# Patient Record
Sex: FEMALE | Race: WHITE | NOT HISPANIC OR LATINO | Employment: UNEMPLOYED | URBAN - METROPOLITAN AREA
[De-identification: names, ages, dates, MRNs, and addresses within clinical notes are randomized per-mention and may not be internally consistent; named-entity substitution may affect disease eponyms.]

---

## 2018-04-25 ENCOUNTER — EVALUATION (OUTPATIENT)
Dept: PHYSICAL THERAPY | Facility: CLINIC | Age: 48
End: 2018-04-25
Payer: COMMERCIAL

## 2018-04-25 ENCOUNTER — TRANSCRIBE ORDERS (OUTPATIENT)
Dept: PHYSICAL THERAPY | Facility: CLINIC | Age: 48
End: 2018-04-25

## 2018-04-25 DIAGNOSIS — S86.301A PERONEAL TENDON INJURY, RIGHT, INITIAL ENCOUNTER: ICD-10-CM

## 2018-04-25 DIAGNOSIS — S86.302A PERONEAL TENDON INJURY, LEFT, INITIAL ENCOUNTER: Primary | ICD-10-CM

## 2018-04-25 PROCEDURE — G8991 OTHER PT/OT GOAL STATUS: HCPCS | Performed by: PHYSICAL THERAPIST

## 2018-04-25 PROCEDURE — G8990 OTHER PT/OT CURRENT STATUS: HCPCS | Performed by: PHYSICAL THERAPIST

## 2018-04-25 PROCEDURE — 97162 PT EVAL MOD COMPLEX 30 MIN: CPT | Performed by: PHYSICAL THERAPIST

## 2018-04-25 PROCEDURE — 97535 SELF CARE MNGMENT TRAINING: CPT | Performed by: PHYSICAL THERAPIST

## 2018-04-25 NOTE — LETTER
2018    III Terry, DPM  [address]  [employer]  [address] [facility] [address] [address]    Patient: Pk Gray   YOB: 1970   Date of Visit: 2018     Encounter Diagnosis     ICD-10-CM    1  Peroneal tendon injury, left, initial encounter S86 302A    2  Peroneal tendon injury, right, initial encounter S86 301A        Dear Dr Shaffer Pain:    Please review the attached Plan of Care from [facility] recent visit  Please verify that you agree therapy should continue by signing the attached document and sending it back to our office  If you have any questions or concerns, please don't hesitate to call  Sincerely,    Oj Palomino PT      Referring Provider:      I certify that I have read the below Plan of Care and certify the need for these services furnished under this plan of treatment while under my care  III Terry, DPM  [address]  [employer]  [address] [facility] [address] [address]  VIA Facsimile: [phone]          PT Evaluation     Today's date: 2018  Patient name: kP Gray  : 1970  MRN: [id number]  Referring provider: Raymond Cross DPM  Dx:   Encounter Diagnosis     ICD-10-CM    1  Peroneal tendon injury, left, initial encounter S86 302A    2  Peroneal tendon injury, right, initial encounter S86 301A                   Assessment  Impairments: abnormal gait, abnormal or restricted ROM, activity intolerance, impaired physical strength and pain with function    Assessment details: Pk Gray is a 49 y o  female who presents to outpatient PT with a  Peroneal tendon injury, left, initial encounter  (primary encounter diagnosis)  Peroneal tendon injury, right, initial encounter  No further referral appears necessary at this time based upon examination results   Pt presents with decreased strength, ROM, balance, functional activity tolerance, and pain with movement in Bilateral feet and ankles, which is  limiting her ability to perform the aforementioned functional activities  Etiologic factors include repetitive poor body mechanics  Foot wear recommendations were made at the time of the initial evaluation  Pt was in agreement with footwear recommendations, and how it can affect her progress and performance at PT  Palpable tenderness to Bilateral soleus and plantar fascia  Prognosis is good given HEP compliance and PT 2/wk  Please contact me if you have any questions or recommendations  Thank you for the opportunity to share in  Brandon khan  Understanding of Dx/Px/POC: good   Prognosis: good    Goals  1  In 4-6 weeks, patient will demonstrate a decrease in pain to 0/10 during functional activities  2  In 4-6 weeks, patient will demonstrate an increase in range of motion by 5 degrees  3  In 4-6 weeks, patient will demonstrate an increase in strength by 1/2 grade on MMT    1  In 6-8 weeks, patient will be independent with their home exercise program  2  In 6-8 weeks, patient will have zero limitations with strength  3  In 6-8 weeks, patient will have zero limitations with ROM  4  In 6-8 weeks, patient will have zero limitations with ambulation  5  In 6-8 weeks, patient will have zero limitations with stair negotiation    Plan  Patient would benefit from: skilled PT  Planned therapy interventions: therapeutic exercise, manual therapy and home exercise program  Frequency: 2x week  Duration in weeks: 4  Treatment plan discussed with: patient  Plan details: Patient was provided a home exercise program and demonstrated an understanding of exercises  Patient was advised to stop performing home exercise program if symptoms increase or new complaints developed  Verbal understanding demonstrated regarding home exercise program instructions          Subjective Evaluation    History of Present Illness  Date of onset: 8/25/2017  Mechanism of injury: Pt reports that her feet began to bother her about the last 8 months, when she realized that the couldn't walk barefoot anymore  She reports that she has difficulty walking over uneven terrain, and that her ankle turn inwards  She saw the foot doctor, who referred her to PT for ankle and foot strengthening  Pain  Current pain ratin  At best pain ratin  At worst pain ratin  Quality: dull ache and throbbing  Relieving factors: relaxation  Aggravating factors: walking, stair climbing and standing    Treatments  Previous treatment: medication (Podiatrist  )  Current treatment: physical therapy  Patient Goals  Patient goals for therapy: increased motion, decreased pain, increased strength and return to sport/leisure activities  Patient goal: Be back to normal          Objective     Active Range of Motion   Left Ankle/Foot   Dorsiflexion (ke): 5 degrees   Plantar flexion: 60 degrees   Inversion: 50 degrees   Eversion: 20 degrees     Right Ankle/Foot   Dorsiflexion (ke): 8 degrees   Plantar flexion: 55 degrees   Inversion: 45 degrees   Eversion: 25 degrees     Additional Active Range of Motion Details  Pain and tenderness to bilateral Cuboid, soleus, and plantar fascia  Very mild calcaneal eversion during static standing  Strength/Myotome Testing     Left Ankle/Foot   Dorsiflexion: 4+  Plantar flexion: 4+  Inversion: 4+  Eversion: 4+    Right Ankle/Foot   Dorsiflexion: 4+  Plantar flexion: 4+  Inversion: 4+  Eversion: 4+          Precautions:     Daily Treatment Diary     Manual              PROM to B ankles                                                                      Exercise Diary              Towel Curls              Inv/Ev Towel             Gastroc Stretch              Pro Stretch              Ankle 4 way             Calf Raise              Leg Press              HR/TR on Leg Press              Step Up              Mini Squat              Biodex LOS              Biodex maze  Modalities

## 2018-04-25 NOTE — PROGRESS NOTES
PT Evaluation     Today's date: 2018  Patient name: Saskia Gong  : 1970  MRN: [id number]  Referring provider: Diaz Arrington DPM  Dx:   Encounter Diagnosis     ICD-10-CM    1  Peroneal tendon injury, left, initial encounter S86 302A    2  Peroneal tendon injury, right, initial encounter S86 301A                   Assessment  Impairments: abnormal gait, abnormal or restricted ROM, activity intolerance, impaired physical strength and pain with function    Assessment details: Saskia Gong is a 49 y o  female who presents to outpatient PT with a  Peroneal tendon injury, left, initial encounter  (primary encounter diagnosis)  Peroneal tendon injury, right, initial encounter  No further referral appears necessary at this time based upon examination results  Pt presents with decreased strength, ROM, balance, functional activity tolerance, and pain with movement in Bilateral feet and ankles, which is  limiting her ability to perform the aforementioned functional activities  Etiologic factors include repetitive poor body mechanics  Foot wear recommendations were made at the time of the initial evaluation  Pt was in agreement with footwear recommendations, and how it can affect her progress and performance at PT  Palpable tenderness to Bilateral soleus and plantar fascia  Prognosis is good given HEP compliance and PT 2/wk  Please contact me if you have any questions or recommendations  [facility] you [facility] the opportunity [facility]  Understanding of Dx/Px/POC: good   Prognosis: good    Goals  1  In 4-6 weeks, patient will demonstrate a decrease in pain to 0/10 during functional activities  2  In 4-6 weeks, patient will demonstrate an increase in range of motion by 5 degrees  3  In 4-6 weeks, patient will demonstrate an increase in strength by 1/2 grade on MMT    1  In 6-8 weeks, patient will be independent with their home exercise program  2    In 6-8 weeks, patient will have zero limitations with strength  3  In 6-8 weeks, patient will have zero limitations with ROM  4  In 6-8 weeks, patient will have zero limitations with ambulation  5  In 6-8 weeks, patient will have zero limitations with stair negotiation    Plan  Patient would benefit from: skilled PT  Planned therapy interventions: therapeutic exercise, manual therapy and home exercise program  Frequency: 2x week  Duration in weeks: 4  Treatment plan discussed with: patient  Plan details: Patient was provided a home exercise program and demonstrated an understanding of exercises  Patient was advised to stop performing home exercise program if symptoms increase or new complaints developed  Verbal understanding demonstrated regarding home exercise program instructions  Subjective Evaluation    History of Present Illness  Date of onset: 2017  Mechanism of injury: Pt reports that her feet began to bother her about the last 8 months, when she realized that the couldn't walk barefoot anymore  She reports that she has difficulty walking over uneven terrain, and that her ankle turn inwards  She saw the foot doctor, who referred her to PT for ankle and foot strengthening     Pain  Current pain ratin  At best pain ratin  At worst pain ratin  Quality: dull ache and throbbing  Relieving factors: relaxation  Aggravating factors: walking, stair climbing and standing    Treatments  Previous treatment: medication (Podiatrist  )  Current treatment: physical therapy  Patient Goals  Patient goals for therapy: increased motion, decreased pain, increased strength and return to sport/leisure activities  Patient goal: Be back to normal          Objective     Active Range of Motion   Left Ankle/Foot   Dorsiflexion (ke): 5 degrees   Plantar flexion: 60 degrees   Inversion: 50 degrees   Eversion: 20 degrees     Right Ankle/Foot   Dorsiflexion (ke): 8 degrees   Plantar flexion: 55 degrees   Inversion: 45 degrees   Eversion: 25 degrees Additional Active Range of Motion Details  Pain and tenderness to bilateral Cuboid, soleus, and plantar fascia  Very mild calcaneal eversion during static standing  Strength/Myotome Testing     Left Ankle/Foot   Dorsiflexion: 4+  Plantar flexion: 4+  Inversion: 4+  Eversion: 4+    Right Ankle/Foot   Dorsiflexion: 4+  Plantar flexion: 4+  Inversion: 4+  Eversion: 4+          Precautions:     Daily Treatment Diary     Manual              PROM to B ankles                                                                      Exercise Diary              Towel Curls              Inv/Ev Towel             Gastroc Stretch              Pro Stretch              Ankle 4 way             Calf Raise              Leg Press              HR/TR on Leg Press              Step Up              Mini Squat              Biodex LOS              Biodex [facility]                                                                                                                            Modalities

## 2018-04-26 ENCOUNTER — OFFICE VISIT (OUTPATIENT)
Dept: PHYSICAL THERAPY | Facility: CLINIC | Age: 48
End: 2018-04-26
Payer: COMMERCIAL

## 2018-04-26 DIAGNOSIS — S86.302A PERONEAL TENDON INJURY, LEFT, INITIAL ENCOUNTER: Primary | ICD-10-CM

## 2018-04-26 DIAGNOSIS — S86.301A PERONEAL TENDON INJURY, RIGHT, INITIAL ENCOUNTER: ICD-10-CM

## 2018-04-26 PROCEDURE — 97140 MANUAL THERAPY 1/> REGIONS: CPT

## 2018-04-26 PROCEDURE — 97110 THERAPEUTIC EXERCISES: CPT

## 2018-04-30 ENCOUNTER — OFFICE VISIT (OUTPATIENT)
Dept: PHYSICAL THERAPY | Facility: CLINIC | Age: 48
End: 2018-04-30
Payer: COMMERCIAL

## 2018-04-30 DIAGNOSIS — S86.302A PERONEAL TENDON INJURY, LEFT, INITIAL ENCOUNTER: ICD-10-CM

## 2018-04-30 DIAGNOSIS — S86.301A PERONEAL TENDON INJURY, RIGHT, INITIAL ENCOUNTER: Primary | ICD-10-CM

## 2018-04-30 PROCEDURE — 97140 MANUAL THERAPY 1/> REGIONS: CPT | Performed by: PHYSICAL THERAPIST

## 2018-04-30 PROCEDURE — 97110 THERAPEUTIC EXERCISES: CPT | Performed by: PHYSICAL THERAPIST

## 2018-04-30 NOTE — PROGRESS NOTES
Daily Note     Today's date: 2018  Patient name: Gary Zamora  : 1970  MRN: [id number]  Referring provider: Bradford Doshi DPM  Dx:   Encounter Diagnosis     ICD-10-CM    1  Peroneal tendon injury, right, initial encounter S86 301A    2  Peroneal tendon injury, left, initial encounter S86 302A                   Subjective: "I have a little pain in the arches of my feet, but overall feeling better " Patient feels the therapy is "definitely helping "      Objective: See treatment diary below  Daily Treatment Diary     Manual              PROM to B ankles  15'            IASTM B/L planter fasia  NV                                                       Exercise Diary              Towel Curls  2' B/L            Inv/Ev Towel 2' ea B/L            Gastroc Stretch  Towel/strap, 30"x3 ea            Pro Stretch              Ankle 4 way Blue TB x30 b/l            Calf Raise              Leg Press              HR/TR on Leg Press              Step Up              Mini Squat              Biodex LOS  Medium L12 x2            Biodex maze  Medium L12 x2            Upright bike L3 10'                                                                                                           Modalities                                                           Assessment: Tolerated treatment well  Patient demonstrated fatigue post treatment, exhibited good technique with therapeutic exercises and would benefit from continued PT Educated patient on self STM to b/l plantar fascia with frozen water bottle  Plan: Continue per plan of care  Progress treatment as tolerated

## 2018-05-02 ENCOUNTER — OFFICE VISIT (OUTPATIENT)
Dept: PHYSICAL THERAPY | Facility: CLINIC | Age: 48
End: 2018-05-02
Payer: COMMERCIAL

## 2018-05-02 DIAGNOSIS — S86.301A PERONEAL TENDON INJURY, RIGHT, INITIAL ENCOUNTER: ICD-10-CM

## 2018-05-02 DIAGNOSIS — S86.302A PERONEAL TENDON INJURY, LEFT, INITIAL ENCOUNTER: Primary | ICD-10-CM

## 2018-05-02 PROCEDURE — 97110 THERAPEUTIC EXERCISES: CPT

## 2018-05-02 PROCEDURE — 97140 MANUAL THERAPY 1/> REGIONS: CPT

## 2018-05-02 NOTE — PROGRESS NOTES
Daily Note     Today's date: 2018  Patient name: Keely Romo  : 1970  MRN: [id number]  Referring provider: Clayton Stone DPM  Dx:   Encounter Diagnosis     ICD-10-CM    1  Peroneal tendon injury, left, initial encounter S86 302A    2  Peroneal tendon injury, right, initial encounter S86 301A                   Subjective: Pt reports that the lateral aspect of her R foot is "annoying" today but denies true pain at rest        Objective: See treatment diary below  Daily Treatment Diary     Manual              PROM to B ankles  15'            IASTM B/L planter fascia  10'                                                       Exercise Diary              Towel Curls  2' B/L            Inv/Ev Towel 2' ea B/L            Gastroc Stretch  Towel/strap, 30"x3 ea NP            Pro Stretch              Ankle 4 way Blue TB x30 b/l            Calf Raise              Leg Press              HR/TR on Leg Press              Step Up              Mini Squat              Biodex LOS  Medium L12 x2            Biodex maze  Medium L12 x2            Upright bike L3 10'            HR on edge of stair with eccentric lowering  5" x30                                                                                              Modalities                                                         Assessment: Tolerated treatment well  Initiated IASTM to b/l planter fascia this date, some fibrous tissue noted  Also progressed to a HR on the edge of stair as listed in POC  Patient exhibited good technique with therapeutic exercises and would benefit from continued PT      Plan: Continue per plan of care  Progress treatment as tolerated

## 2018-05-08 ENCOUNTER — OFFICE VISIT (OUTPATIENT)
Dept: PHYSICAL THERAPY | Facility: CLINIC | Age: 48
End: 2018-05-08
Payer: COMMERCIAL

## 2018-05-08 DIAGNOSIS — S86.301A PERONEAL TENDON INJURY, RIGHT, INITIAL ENCOUNTER: ICD-10-CM

## 2018-05-08 DIAGNOSIS — S86.302A PERONEAL TENDON INJURY, LEFT, INITIAL ENCOUNTER: Primary | ICD-10-CM

## 2018-05-08 PROCEDURE — 97110 THERAPEUTIC EXERCISES: CPT | Performed by: PHYSICAL THERAPIST

## 2018-05-08 PROCEDURE — 97140 MANUAL THERAPY 1/> REGIONS: CPT | Performed by: PHYSICAL THERAPIST

## 2018-05-08 NOTE — PROGRESS NOTES
Daily Note     Today's date: 2018  Patient name: Kell Winter  : 1970  MRN: [id number]  Referring provider: Neno Webb DPM  Dx:   Encounter Diagnosis     ICD-10-CM    1  Peroneal tendon injury, left, initial encounter S86 302A    2  Peroneal tendon injury, right, initial encounter S86 301A                   Subjective: "I did a lot of walking this weekend at my nephew's graduation so I was pretty sore but I was able to rest yesterday "      Objective: See treatment diary below  Daily Treatment Diary     Manual              PROM to B ankles  10'            IASTM B/L planter fascia and medial gastroc  10'                                                       Exercise Diary              Towel Curls  2' B/L            Inv/Ev Towel 2' ea B/L            Gastroc Stretch  Towel/strap, 30"x3 ea            Pro Stretch              Ankle 4 way Blue TB x30 b/l-NP            Calf Raise              Leg Press              HR/TR on Leg Press              Step Up              Mini Squat              Biodex LOS  Medium L10 x2            Biodex maze  Medium L10 x2            Upright bike L3 10'            HR on edge of stair with eccentric lowering  5" x30                                                                                              Modalities                                                           Assessment: Tolerated treatment well  Continued with IASTM to plantar fascia but also performed along medial aspect of gastroc b/l  Tolerated manuals well  Patient exhibited good technique with therapeutic exercises and would benefit from continued PT      Plan: Continue per plan of care  Progress treatment as tolerated

## 2018-05-10 ENCOUNTER — APPOINTMENT (OUTPATIENT)
Dept: PHYSICAL THERAPY | Facility: CLINIC | Age: 48
End: 2018-05-10
Payer: COMMERCIAL

## 2018-05-11 ENCOUNTER — APPOINTMENT (OUTPATIENT)
Dept: PHYSICAL THERAPY | Facility: CLINIC | Age: 48
End: 2018-05-11
Payer: COMMERCIAL

## 2018-05-15 ENCOUNTER — OFFICE VISIT (OUTPATIENT)
Dept: PHYSICAL THERAPY | Facility: CLINIC | Age: 48
End: 2018-05-15
Payer: COMMERCIAL

## 2018-05-15 DIAGNOSIS — S86.301A PERONEAL TENDON INJURY, RIGHT, INITIAL ENCOUNTER: ICD-10-CM

## 2018-05-15 DIAGNOSIS — S86.302A PERONEAL TENDON INJURY, LEFT, INITIAL ENCOUNTER: Primary | ICD-10-CM

## 2018-05-15 PROCEDURE — 97110 THERAPEUTIC EXERCISES: CPT | Performed by: PHYSICAL THERAPIST

## 2018-05-15 PROCEDURE — 97140 MANUAL THERAPY 1/> REGIONS: CPT | Performed by: PHYSICAL THERAPIST

## 2018-05-15 NOTE — PROGRESS NOTES
Daily Note     Today's date: 5/15/2018  Patient name: Greg Chen  : 1970  MRN: [id number]  Referring provider: Brandon Velazquez DPM  Dx:   Encounter Diagnosis     ICD-10-CM    1  Peroneal tendon injury, left, initial encounter S86 302A    2  Peroneal tendon injury, right, initial encounter S86 301A                   Subjective: Pt reports that her feet are feeling a little sore today  Objective: See treatment diary below    Daily Treatment Diary     Manual            PROM to B ankles  10'  10'          IASTM B/L planter fascia and medial gastroc  10'  10'                                                     Exercise Diary            Towel Curls  2' B/L  2' b/l          Inv/Ev Towel 2' ea B/L  2' b/l          Gastroc Stretch  Towel/strap, 30"x3 ea            Pro Stretch    30''3x          Ankle 4 way Blue TB x30 b/l-NP  Blue 30x          Calf Raise              Leg Press              HR/TR on Leg Press              Step Up              Mini Squat              Biodex LOS  Medium L10 x2  Medium L 10          Biodex maze  Medium L10 x2  Medium L 10           Upright bike L3 10'  L3 10min           HR on edge of stair with eccentric lowering  5" x30  5''30x                                                                                             Modalities                                                                 Assessment: Tolerated treatment well  Patient exhibited good technique with therapeutic exercises  Added Pro stretch this session, to help increase the flexibility of bilateral gastroc  Plan: Continue per plan of care

## 2018-05-17 ENCOUNTER — OFFICE VISIT (OUTPATIENT)
Dept: PHYSICAL THERAPY | Facility: CLINIC | Age: 48
End: 2018-05-17
Payer: COMMERCIAL

## 2018-05-17 DIAGNOSIS — S86.301A PERONEAL TENDON INJURY, RIGHT, INITIAL ENCOUNTER: ICD-10-CM

## 2018-05-17 DIAGNOSIS — S86.302A PERONEAL TENDON INJURY, LEFT, INITIAL ENCOUNTER: Primary | ICD-10-CM

## 2018-05-17 PROCEDURE — 97110 THERAPEUTIC EXERCISES: CPT | Performed by: PHYSICAL THERAPIST

## 2018-05-17 PROCEDURE — 97140 MANUAL THERAPY 1/> REGIONS: CPT | Performed by: PHYSICAL THERAPIST

## 2018-05-17 NOTE — PROGRESS NOTES
Daily Note     Today's date: 2018  Patient name: Daja Hidalgo  : 1970  MRN: [id number]  Referring provider: Alejandra Zurita DPM  Dx:   Encounter Diagnosis     ICD-10-CM    1  Peroneal tendon injury, left, initial encounter S86 302A    2  Peroneal tendon injury, right, initial encounter S86 301A                   Subjective: " My feet feel ok, I still haven't found a wide pair of shoes yet "       Objective: See treatment diary below    Daily Treatment Diary     Manual           PROM to B ankles  10'  10' NP         IASTM B/L planter fascia and medial gastroc  10'  10' 15 Min                                                    Exercise Diary           Towel Curls  2' B/L  2' b/l 2' b/l          Inv/Ev Towel 2' ea B/L  2' b/l 2'bl/l          Gastroc Stretch  Towel/strap, 30"x3 ea            Pro Stretch    30''3x 30''3x Bilateral          Ankle 4 way Blue TB x30 b/l-NP  Blue 30x Blue 30x          Calf Raise              Leg Press              HR/TR on Leg Press              Step Up              Mini Squat              Biodex LOS  Medium L10 x2  Medium L 10 Medium L 8          Biodex maze  Medium L10 x2  Medium L 10  Medium L8          Upright bike L3 10'  L3 10min  L3 10 min          HR on edge of stair with eccentric lowering  5" x30  5''30x           Leg press    80 3x10         HR/TR     60 3x10                                                                  Modalities                                                             Assessment: Tolerated treatment well  Patient exhibited good technique with therapeutic exercises  Added Leg press and  HR/TR on the leg press this session to help with ankle and lower extremity strengthening  Plan: Continue per plan of care

## 2018-05-22 ENCOUNTER — APPOINTMENT (OUTPATIENT)
Dept: PHYSICAL THERAPY | Facility: CLINIC | Age: 48
End: 2018-05-22
Payer: COMMERCIAL

## 2018-05-25 ENCOUNTER — APPOINTMENT (OUTPATIENT)
Dept: PHYSICAL THERAPY | Facility: CLINIC | Age: 48
End: 2018-05-25
Payer: COMMERCIAL

## 2018-06-28 ENCOUNTER — EVALUATION (OUTPATIENT)
Dept: PHYSICAL THERAPY | Facility: CLINIC | Age: 48
End: 2018-06-28
Payer: COMMERCIAL

## 2018-06-28 PROCEDURE — G8991 OTHER PT/OT GOAL STATUS: HCPCS | Performed by: PHYSICAL THERAPIST

## 2018-06-28 PROCEDURE — G8990 OTHER PT/OT CURRENT STATUS: HCPCS | Performed by: PHYSICAL THERAPIST

## 2018-06-28 PROCEDURE — 97164 PT RE-EVAL EST PLAN CARE: CPT | Performed by: PHYSICAL THERAPIST

## 2018-06-28 PROCEDURE — 97110 THERAPEUTIC EXERCISES: CPT | Performed by: PHYSICAL THERAPIST

## 2018-06-28 NOTE — LETTER
June 29, 2018    III Pershing, DPM  [address]  [employer]  [address] [address] [address]    Patient: Benjamin Gomez   YOB: 1970   Date of Visit: 6/28/2018   No diagnosis found  Dear Dr Carmen Medellin:    Please review the attached Plan of Care from [facility] recent visit  Please verify that you agree therapy should continue by signing the attached document and sending it back to our office  If you have any questions or concerns, please don't hesitate to call  Sincerely,    Cooper Blackburn, PT      Referring Provider:      I certify that I have read the below Plan of Care and certify the need for these services furnished under this plan of treatment while under my care  III Pershing, DPM  [address]  [employer]  [place] [address]  VIA Facsimile: [phone]                                                                 Re-Assessment         Assessment  Impairments: abnormal gait, abnormal or restricted ROM, activity intolerance, impaired physical strength and pain with function    Assessment details  Benjamin Gomez has demonstrated decreased pain, increased strength, increased range of motion, and increased activity tolerance since starting physical therapy services  They report an overall improvement of 90 % thus far  At this time, patient has achieved their maximum functional benefit from skilled physical therapy services and will be discharged to their [facility]  Patient is in agreement with the plan of care  As a result, patient is discharged from physical therapy    Understanding of Dx/Px/POC: good   Prognosis: good    Goals  1  In 4-6 weeks, patient will demonstrate a decrease in pain to 0/10 during functional activities  Met   2  In 4-6 weeks, patient will demonstrate an increase in range of motion by 5 degrees  Met   3  In 4-6 weeks, patient will demonstrate an increase in strength by 1/2 grade on MMT  Partially Met     1    In 6-8 weeks, patient will be independent with their home exercise program  Met   2  In 6-8 weeks, patient will have zero limitations with strength  Partially Met   3  In 6-8 weeks, patient will have zero limitations with ROM  Met   4  In 6-8 weeks, patient will have zero limitations with ambulation  Met   5  In 6-8 weeks, patient will have zero limitations with stair negotiation  Met     Plan  Patient would benefit from: skilled PT  Planned therapy interventions: therapeutic exercise, manual therapy and home exercise program  Frequency: 2x week  Duration in weeks: 4  Treatment plan discussed with: patient  Plan details: Patient was provided a home exercise program and demonstrated an understanding of exercises  Patient was advised to stop performing home exercise program if symptoms increase or new complaints developed  Verbal understanding demonstrated regarding home exercise program instructions  Subjective Evaluation    History of Present Illness  Date of onset: 2017  Mechanism of injury:  Pt reports that she feels about 90 percent better since she's started physical therapy  She reports that she is not back to her normal activities  She still gets occasional pain in her big toe, and in the arch of her foot  She went to the [employer] store in [place], where they fitted her with wide running shoes  She reports that the shoes made her feet feel much better  She would like to continue strengthening at physical therapy         Pain  Current pain ratin  At best pain ratin  At worst pain ratin  Quality: dull ache and throbbing  Relieving factors: relaxation  Aggravating factors: walking, stair climbing and standing    Treatments  Previous treatment: medication (Podiatrist  )  Current treatment: physical therapy  Patient Goals  Patient goals for therapy: increased motion, decreased pain, increased strength and return to sport/leisure activities  Patient goal: Be back to normal          Objective     Active Range of Motion   Left Ankle/Foot                      6/27  Dorsiflexion (ke): 5 degrees  8 degrees  Plantar flexion: 60 degrees    70 degrees   Inversion: 50 degrees            40 degrees  Eversion: 20 degrees            35 degrees    Right Ankle/Foot                    6/27  Dorsiflexion (ke): 8 degrees    10 degrees   Plantar flexion: 55 degrees      65 degrees   Inversion: 45 degrees               55 degrees   Eversion: 25 degrees               15 degrees     Additional Active Range of Motion Details  Pain and tenderness to bilateral Cuboid, soleus, and plantar fascia  Very mild calcaneal eversion during static standing       Strength/Myotome Testing     Left Ankle/Foot                6/27   Dorsiflexion: 4+              4+  Plantar flexion: 4+          4+  Inversion: 4+                  4+  Eversion: 4+                   4+  Right Ankle/Foot              6/27   Dorsiflexion: 4+              4+  Plantar flexion: 4+          4+  Inversion: 4+                  4+  Eversion: 4+                  4+

## 2018-06-28 NOTE — PROGRESS NOTES
Re-Assessment         Assessment  Impairments: abnormal gait, abnormal or restricted ROM, activity intolerance, impaired physical strength and pain with function    Assessment details  Jovany Hernandez has demonstrated decreased pain, increased strength, increased range of motion, and increased activity tolerance since starting physical therapy services  They report an overall improvement of 90 % thus far  At this time, patient has achieved their maximum functional benefit from skilled physical therapy services and will be discharged to their [facility]  Patient is in agreement with the plan of care  As a result, patient is discharged from physical therapy    Understanding of Dx/Px/POC: good   Prognosis: good    Goals  1  In 4-6 weeks, patient will demonstrate a decrease in pain to 0/10 during functional activities  Met   2  In 4-6 weeks, patient will demonstrate an increase in range of motion by 5 degrees  Met   3  In 4-6 weeks, patient will demonstrate an increase in strength by 1/2 grade on MMT  Partially Met     1  In 6-8 weeks, patient will be independent with their home exercise program  Met   2  In 6-8 weeks, patient will have zero limitations with strength  Partially Met   3  In 6-8 weeks, patient will have zero limitations with ROM  Met   4  In 6-8 weeks, patient will have zero limitations with ambulation  Met   5  In 6-8 weeks, patient will have zero limitations with stair negotiation  Met     Plan  Patient would benefit from: skilled PT  Planned therapy interventions: therapeutic exercise, manual therapy and home exercise program  Frequency: 2x week  Duration in weeks: 4  Treatment plan discussed with: patient  Plan details: Patient was provided a home exercise program and demonstrated an understanding of exercises  Patient was advised to stop performing home exercise program if symptoms increase or new complaints developed    Verbal understanding demonstrated regarding home exercise program instructions  Subjective Evaluation    History of Present Illness  Date of onset: 2017  Mechanism of injury:  Pt reports that she feels about 90 percent better since she's started physical therapy  She reports that she is not back to her normal activities  She still gets occasional pain in her big toe, and in the arch of her foot  She went to the [employer] in [place], where they fitted her with wide running shoes  She reports that the shoes made her feet feel much better  She would like to continue strengthening at physical therapy  Pain  Current pain ratin  At best pain ratin  At worst pain ratin  Quality: dull ache and throbbing  Relieving factors: relaxation  Aggravating factors: walking, stair climbing and standing    Treatments  Previous treatment: medication (Podiatrist  )  Current treatment: physical therapy  Patient Goals  Patient goals for therapy: increased motion, decreased pain, increased strength and return to sport/leisure activities  Patient goal: Be back to normal          Objective     Active Range of Motion   Left Ankle/Foot                        Dorsiflexion (ke): 5 degrees  8 degrees  Plantar flexion: 60 degrees    70 degrees   Inversion: 50 degrees            40 degrees  Eversion: 20 degrees            35 degrees    Right Ankle/Foot                      Dorsiflexion (ke): 8 degrees    10 degrees   Plantar flexion: 55 degrees      65 degrees   Inversion: 45 degrees               55 degrees   Eversion: 25 degrees               15 degrees     Additional Active Range of Motion Details  Pain and tenderness to bilateral Cuboid, soleus, and plantar fascia  Very mild calcaneal eversion during static standing       Strength/Myotome Testing     Left Ankle/Foot                   Dorsiflexion: 4+              4+  Plantar flexion: 4+          4+  Inversion: 4+                  4+  Eversion: 4+ 4+  Right Ankle/Foot              6/27   Dorsiflexion: 4+              4+  Plantar flexion: 4+          4+  Inversion: 4+                  4+  Eversion: 4+                  4+

## 2018-06-29 ENCOUNTER — TRANSCRIBE ORDERS (OUTPATIENT)
Dept: PHYSICAL THERAPY | Facility: CLINIC | Age: 48
End: 2018-06-29

## 2018-06-29 DIAGNOSIS — S86.302A PERONEAL TENDON INJURY, LEFT, INITIAL ENCOUNTER: Primary | ICD-10-CM

## 2018-10-29 ENCOUNTER — APPOINTMENT (OUTPATIENT)
Dept: PHYSICAL THERAPY | Facility: CLINIC | Age: 48
End: 2018-10-29
Payer: COMMERCIAL

## 2018-11-01 ENCOUNTER — EVALUATION (OUTPATIENT)
Dept: PHYSICAL THERAPY | Facility: CLINIC | Age: 48
End: 2018-11-01
Payer: COMMERCIAL

## 2018-11-01 ENCOUNTER — TRANSCRIBE ORDERS (OUTPATIENT)
Dept: PHYSICAL THERAPY | Facility: CLINIC | Age: 48
End: 2018-11-01

## 2018-11-01 DIAGNOSIS — M76.70 PERONEAL TENDINITIS, UNSPECIFIED LATERALITY: Primary | ICD-10-CM

## 2018-11-01 DIAGNOSIS — M72.9 FIBROMATOSES OF MUSCLE, LIGAMENT, AND FASCIA: ICD-10-CM

## 2018-11-01 DIAGNOSIS — R26.9 GAIT ABNORMALITY: ICD-10-CM

## 2018-11-01 DIAGNOSIS — M72.9 FASCIITIS: ICD-10-CM

## 2018-11-01 PROCEDURE — G8991 OTHER PT/OT GOAL STATUS: HCPCS | Performed by: PHYSICAL THERAPIST

## 2018-11-01 PROCEDURE — G8990 OTHER PT/OT CURRENT STATUS: HCPCS | Performed by: PHYSICAL THERAPIST

## 2018-11-01 PROCEDURE — 97140 MANUAL THERAPY 1/> REGIONS: CPT | Performed by: PHYSICAL THERAPIST

## 2018-11-01 PROCEDURE — 97162 PT EVAL MOD COMPLEX 30 MIN: CPT | Performed by: PHYSICAL THERAPIST

## 2018-11-01 NOTE — LETTER
2018    III [place], DPM  [address]  [address] [facility] [address] [address]    Patient: Cuba Londono   YOB: 1970   Date of Visit: 2018     Encounter Diagnosis     ICD-10-CM    1  Peroneal tendinitis, unspecified laterality M76 70    2  Gait abnormality R26 9    3  Fasciitis M72 9        Dear Dr Wellington Nail:    Please review the attached Plan of Care from [facility] recent visit  Please verify that you agree therapy should continue by signing the attached document and sending it back to our office  If you have any questions or concerns, please don't hesitate to call  Sincerely,    Cleveland Young PT      Referring Provider:      I certify that I have read the below Plan of Care and certify the need for these services furnished under this plan of treatment while under my care  III [place], DPM  [address]  [address] [facility] [address] [address]  VIA Facsimile: [phone]          PT Evaluation     Today's date: 2018  Patient name: Cuba Londono  : 1970  MRN: [id number]  Referring provider: Carlota Patel DPM   Dx:   Encounter Diagnosis     ICD-10-CM    1  Peroneal tendinitis, unspecified laterality M76 70    2  Gait abnormality R26 9    3  Fasciitis M72 9                   Assessment  Impairments: abnormal gait, abnormal or restricted ROM, activity intolerance, impaired physical strength, pain with function and poor posture     Assessment details: Cuba Londono is a 51 y o  female who presents to outpatient PT with a  Peroneal tendinitis, unspecified laterality  (primary encounter diagnosis)  Gait abnormality  Fasciitis  No further referral appears necessary at this time based upon examination results  Pt presents with decreased strength, ROM, balance, functional activity tolerance, and pain with movement in her bilateral feet and ankles which is  limiting her ability to perform the aforementioned functional activities  Etiologic factors include repetitive poor body mechanics  Prognosis is good given HEP compliance and PT 2-3/wk  Please contact me if you have any questions or recommendations  Thank you for the opportunity to share in  Khalida's care  Understanding of Dx/Px/POC: good   Prognosis: good    Goals  1  In 4-6 weeks, patient will demonstrate a decrease in pain to 0/10 during functional activities  2  In 4-6 weeks, patient will demonstrate an increase in range of motion by 5 degrees  3  In 4-6 weeks, patient will demonstrate an increase in strength by 1/2 grade on MMT    1  In 6-8 weeks, patient will be independent with their home exercise program  2  In 6-8 weeks, patient will have zero limitations with strength  3  In 6-8 weeks, patient will have zero limitations with ROM  4  In 6-8 weeks, patient will have zero limitations with ambulation  5  In 6-8 weeks, patient will have zero limitations with stair negotiation    Plan  Patient would benefit from: skilled physical therapy  Planned therapy interventions: manual therapy, therapeutic exercise and home exercise program  Frequency: 2x week  Duration in weeks: 4  Treatment plan discussed with: patient        Subjective Evaluation    History of Present Illness  Date of onset: 2018  Mechanism of injury: The patient is a 51year old female with a chief complaint of bilateral foot pain  She was treated at physical therapy before, for this same condition  She did get relief from therapy   She reports that she has not been keeping up with her exercises at home  " I want to catch this before it gets bad "   Pain  Current pain rating: 3  At best pain ratin  At worst pain ratin  Quality: throbbing and dull ache  Relieving factors: ice, relaxation, change in position and support  Aggravating factors: standing and walking    Social Support    Employment status: not working  Hand dominance: right      Diagnostic Tests  No diagnostic tests performed  Treatments  Previous treatment: physical therapy  Current treatment: physical therapy  Patient Goals  Patient goals for therapy: decreased pain, increased strength and increased motion          Objective     Active Range of Motion   Left Ankle/Foot   Dorsiflexion (ke): WFL  Dorsiflexion (kf): WFL  Plantar flexion: WFL  Inversion: WFL    Right Ankle/Foot   Dorsiflexion (ke): WFL  Dorsiflexion (kf): WFL  Plantar flexion: WFL  Inversion: WFL    Additional Active Range of Motion Details  Excessive Calcaneal Inversion bilaterally, during static standing  Pain and tenderness to the base of the 5th metatarsal,     Good wear pattern on the bottom of both shoes  Pain with resisted Eversion on right  side, Resisted inversion on left side       Strength/Myotome Testing     Left Ankle/Foot   Dorsiflexion: 4+  Plantar flexion: 4+  Inversion: 4+  Eversion: 4+    Right Ankle/Foot   Dorsiflexion: 4+  Plantar flexion: 4+  Inversion: 4+  Eversion: 4+        Daily Treatment Diary     Manual  11/1            PROM - B ankles all planes             IASTM to B Gastroc                                         15 min                 Exercise Diary              NuStep or Bike             Gastroc towel stretch             Ankle AROM - all directions             Ankle isometrics - all directions             Ankle TB - all directions             Towel - Inv/Evr             HR/TR             Wobbleboard - all directions             Biodex - Limits of Stability             Biodex - Maze             Biodex - Random Control             Standing soleus stretch             Standing gastroc stretch                 Modalities

## 2018-11-01 NOTE — PROGRESS NOTES
PT Evaluation     Today's date: 2018  Patient name: Amita Padron  : 1970  MRN: [id number]  Referring provider: Vida Moran DPM   Dx:   Encounter Diagnosis     ICD-10-CM    1  Peroneal tendinitis, unspecified laterality M76 70    2  Gait abnormality R26 9    3  Fasciitis M72 9                   Assessment  Impairments: abnormal gait, abnormal or restricted ROM, activity intolerance, impaired physical strength, pain with function and poor posture     Assessment details: Amita Padron is a 51 y o  female who presents to outpatient PT with a  Peroneal tendinitis, unspecified laterality  (primary encounter diagnosis)  Gait abnormality  Fasciitis  No further referral appears necessary at this time based upon examination results  Pt presents with decreased strength, ROM, balance, functional activity tolerance, and pain with movement in her bilateral feet and ankles which is  limiting her ability to perform the aforementioned functional activities  Etiologic factors include repetitive poor body mechanics  Prognosis is good given HEP compliance and PT 2-3/wk  Please contact me if you have any questions or recommendations  Thank you for the opportunity to share in  Khalida's care  Understanding of Dx/Px/POC: good   Prognosis: good    Goals  1  In 4-6 weeks, patient will demonstrate a decrease in pain to 0/10 during functional activities  2  In 4-6 weeks, patient will demonstrate an increase in range of motion by 5 degrees  3  In 4-6 weeks, patient will demonstrate an increase in strength by 1/2 grade on MMT    1  In 6-8 weeks, patient will be independent with their home exercise program  2  In 6-8 weeks, patient will have zero limitations with strength  3  In 6-8 weeks, patient will have zero limitations with ROM  4  In 6-8 weeks, patient will have zero limitations with ambulation  5    In 6-8 weeks, patient will have zero limitations with stair negotiation    Plan  Patient would benefit from: skilled physical therapy  Planned therapy interventions: manual therapy, therapeutic exercise and home exercise program  Frequency: 2x week  Duration in weeks: 4  Treatment plan discussed with: patient        Subjective Evaluation    History of Present Illness  Date of onset: 2018  Mechanism of injury: The patient is a 51year old female with a chief complaint of bilateral foot pain  She was treated at physical therapy before, for this same condition  She did get relief from therapy  She reports that she has not been keeping up with her exercises at home  " I want to catch this before it gets bad "   Pain  Current pain rating: 3  At best pain ratin  At worst pain ratin  Quality: throbbing and dull ache  Relieving factors: ice, relaxation, change in position and support  Aggravating factors: standing and walking    Social Support    Employment status: not working  Hand dominance: right      Diagnostic Tests  No diagnostic tests performed  Treatments  Previous treatment: physical therapy  Current treatment: physical therapy  Patient Goals  Patient goals for therapy: decreased pain, increased strength and increased motion          Objective     Active Range of Motion   Left Ankle/Foot   Dorsiflexion (ke): WFL  Dorsiflexion (kf): WFL  Plantar flexion: WFL  Inversion: WFL    Right Ankle/Foot   Dorsiflexion (ke): WFL  Dorsiflexion (kf): WFL  Plantar flexion: WFL  Inversion: WFL    Additional Active Range of Motion Details  Excessive Calcaneal Inversion bilaterally, during static standing  Pain and tenderness to the base of the 5th metatarsal,     Good wear pattern on the bottom of both shoes  Pain with resisted Eversion on right  side, Resisted inversion on left side       Strength/Myotome Testing     Left Ankle/Foot   Dorsiflexion: 4+  Plantar flexion: 4+  Inversion: 4+  Eversion: 4+    Right Ankle/Foot   Dorsiflexion: 4+  Plantar flexion: 4+  Inversion: 4+  Eversion: 4+        Daily Treatment Diary     Manual  11/1            PROM - B ankles all planes             IASTM to B Gastroc                                         15 min                 Exercise Diary              NuStep or Bike             Gastroc towel stretch             Ankle AROM - all directions             Ankle isometrics - all directions             Ankle TB - all directions             Towel - Inv/Evr             HR/TR             Wobbleboard - all directions             Biodex - Limits of Stability             Biodex - Maze             Biodex - Random Control             Standing soleus stretch             Standing gastroc stretch                 Modalities

## 2018-11-07 ENCOUNTER — OFFICE VISIT (OUTPATIENT)
Dept: PHYSICAL THERAPY | Facility: CLINIC | Age: 48
End: 2018-11-07
Payer: COMMERCIAL

## 2018-11-07 DIAGNOSIS — M76.70 PERONEAL TENDINITIS, UNSPECIFIED LATERALITY: Primary | ICD-10-CM

## 2018-11-07 DIAGNOSIS — M72.9 FASCIITIS: ICD-10-CM

## 2018-11-07 DIAGNOSIS — R26.9 GAIT ABNORMALITY: ICD-10-CM

## 2018-11-07 PROCEDURE — 97140 MANUAL THERAPY 1/> REGIONS: CPT | Performed by: PHYSICAL THERAPIST

## 2018-11-07 PROCEDURE — 97110 THERAPEUTIC EXERCISES: CPT | Performed by: PHYSICAL THERAPIST

## 2018-11-07 NOTE — PROGRESS NOTES
Daily Note     Today's date: 2018  Patient name: Bentley Ramirez  : 1970  MRN: [id number]  Referring provider: Amairani Remy DPM  Dx:   Encounter Diagnosis     ICD-10-CM    1  Peroneal tendinitis, unspecified laterality M76 70    2  Gait abnormality R26 9    3  Fasciitis M72 9                   Subjective: I felt good when I left after the last session, i'm still a little sore today "       Objective: See treatment diary below  Manual              PROM - B ankles all planes             IASTM to B Gastroc   25 min                                       15 min  25 min                Exercise Diary              NuStep or Bike  [address] [facility] 10'           Gastroc towel stretch  30''3x Bilateral            Ankle AROM - all directions             Ankle isometrics - all directions             Ankle TB - all directions             Towel - Inv/Evr  Curls, Inv/Ev 30x            HR/TR  30x           Wobbleboard - all directions             Biodex - Limits of Stability             Biodex - Maze             Biodex - Random Control             Standing soleus stretch             Standing gastroc stretch    Pro stretch   30''3x       30''3x                     Modalities                                                               Assessment: Tolerated treatment well  Patient exhibited good technique with therapeutic exercises  Pt presents with significant soft tissue restrictions to her bilateral gastroc, during IASTM  Plan: Continue per plan of care

## 2018-11-09 ENCOUNTER — APPOINTMENT (OUTPATIENT)
Dept: PHYSICAL THERAPY | Facility: CLINIC | Age: 48
End: 2018-11-09
Payer: COMMERCIAL

## 2018-11-12 ENCOUNTER — OFFICE VISIT (OUTPATIENT)
Dept: PHYSICAL THERAPY | Facility: CLINIC | Age: 48
End: 2018-11-12
Payer: COMMERCIAL

## 2018-11-12 DIAGNOSIS — M72.9 FASCIITIS: ICD-10-CM

## 2018-11-12 DIAGNOSIS — R26.9 GAIT ABNORMALITY: ICD-10-CM

## 2018-11-12 DIAGNOSIS — M76.70 PERONEAL TENDINITIS, UNSPECIFIED LATERALITY: Primary | ICD-10-CM

## 2018-11-12 PROCEDURE — 97140 MANUAL THERAPY 1/> REGIONS: CPT

## 2018-11-12 PROCEDURE — 97110 THERAPEUTIC EXERCISES: CPT

## 2018-11-12 NOTE — PROGRESS NOTES
Daily Note     Today's date: 2018  Patient name: Rachel Rodríguez  : 1970  MRN: [id number]  Referring provider: Milagro Anna DPM  Dx: No diagnosis found  Subjective: Pt  Reports she still has pain on outside of R ankle as well some pain L foot  Objective: See treatment diary below  Manual            PROM - B ankles all planes             IASTM to B Gastroc   25 min                                       15 min  25 min  25 min              Exercise Diary             NuStep or Bike  [address] [facility] 10' [address] [facility] 10'          Gastroc towel stretch  30''3x Bilateral  30"3x b/l          Ankle AROM - all directions   10x b/l          Ankle isometrics - all directions             Ankle TB - all directions   10x b/l yellow          Towel - Inv/Evr  Curls, Inv/Ev 30x  Curls  INV/EV  30x          HR/TR  30x 10x as/pt request          Wobbleboard - all directions             Biodex - Limits of Stability             Biodex - Maze             Biodex - Random Control             Standing soleus stretch             Standing gastroc stretch    Pro stretch   30''3x       30''3x       30"3x      30"3x              Modalities                                                               Assessment: Tolerated treatment well  Patient exhibited good technique with therapeutic exercises and would benefit from continued PT  Pt  notes relief following RX session  Plan: Progress treatment as tolerated

## 2018-11-14 ENCOUNTER — APPOINTMENT (OUTPATIENT)
Dept: PHYSICAL THERAPY | Facility: CLINIC | Age: 48
End: 2018-11-14
Payer: COMMERCIAL

## 2018-11-15 ENCOUNTER — OFFICE VISIT (OUTPATIENT)
Dept: PHYSICAL THERAPY | Facility: CLINIC | Age: 48
End: 2018-11-15
Payer: COMMERCIAL

## 2018-11-15 DIAGNOSIS — M76.70 PERONEAL TENDINITIS, UNSPECIFIED LATERALITY: Primary | ICD-10-CM

## 2018-11-15 DIAGNOSIS — R26.9 GAIT ABNORMALITY: ICD-10-CM

## 2018-11-15 DIAGNOSIS — M72.9 FASCIITIS: ICD-10-CM

## 2018-11-15 PROCEDURE — 97140 MANUAL THERAPY 1/> REGIONS: CPT

## 2018-11-15 PROCEDURE — 97110 THERAPEUTIC EXERCISES: CPT

## 2018-11-15 NOTE — PROGRESS NOTES
Daily Note     Today's date: 11/15/2018  Patient name: Taisha Ulloa  : 1970  MRN: [id number]  Referring provider: Amanda Sánchez DPM  Dx:   Encounter Diagnosis     ICD-10-CM    1  Peroneal tendinitis, unspecified laterality M76 70    2  Gait abnormality R26 9    3  Fasciitis M72 9                   Subjective: "I am having some pain in the left foot into my big toe "       Objective: See treatment diary below  Manual  11/1 11/6  11/12 11/15         PROM - B ankles all planes             IASTM to B Gastroc   25 min                                       15 min  25 min  25 min 25 min             Exercise Diary   11/6 11/12 11/15         NuStep or Bike  [facility] 10' [facility] 10' [facility] 10' L5         Gastroc towel stretch  30''3x Bilateral  30"3x b/l 30"3x b/l         Ankle AROM - all directions   10x b/l          Ankle isometrics - all directions             Ankle TB - all directions   10x b/l yellow 10x b/l Blue         Towel - Inv/Evr  Curls, Inv/Ev 30x  Curls  INV/EV  30x          HR/TR  30x 10x as/pt request x20         Wobbleboard - all directions             Biodex - Limits of Stability             Biodex - Maze             Biodex - Random Control             Standing soleus stretch             Standing gastroc stretch    Pro stretch   30''3x       30''3x       30"3x      30"3x              Modalities                                                         Assessment: Tolerated treatment well  Patient exhibited good technique with therapeutic exercises and would benefit from continued PT  No adverse affect to IASTM this date  Plan: Continue per plan of care  Progress treatment as tolerated

## 2018-11-19 ENCOUNTER — OFFICE VISIT (OUTPATIENT)
Dept: PHYSICAL THERAPY | Facility: CLINIC | Age: 48
End: 2018-11-19
Payer: COMMERCIAL

## 2018-11-19 DIAGNOSIS — R26.9 GAIT ABNORMALITY: ICD-10-CM

## 2018-11-19 DIAGNOSIS — M72.9 FASCIITIS: ICD-10-CM

## 2018-11-19 DIAGNOSIS — M76.70 PERONEAL TENDINITIS, UNSPECIFIED LATERALITY: Primary | ICD-10-CM

## 2018-11-19 PROCEDURE — 97140 MANUAL THERAPY 1/> REGIONS: CPT | Performed by: PHYSICAL THERAPIST

## 2018-11-19 PROCEDURE — 97110 THERAPEUTIC EXERCISES: CPT | Performed by: PHYSICAL THERAPIST

## 2018-11-20 ENCOUNTER — OFFICE VISIT (OUTPATIENT)
Dept: PHYSICAL THERAPY | Facility: CLINIC | Age: 48
End: 2018-11-20
Payer: COMMERCIAL

## 2018-11-20 DIAGNOSIS — M76.70 PERONEAL TENDINITIS, UNSPECIFIED LATERALITY: Primary | ICD-10-CM

## 2018-11-20 DIAGNOSIS — R26.9 GAIT ABNORMALITY: ICD-10-CM

## 2018-11-20 DIAGNOSIS — M72.9 FASCIITIS: ICD-10-CM

## 2018-11-20 PROCEDURE — 97110 THERAPEUTIC EXERCISES: CPT

## 2018-11-20 PROCEDURE — 97035 APP MDLTY 1+ULTRASOUND EA 15: CPT

## 2018-11-20 NOTE — PROGRESS NOTES
Daily Note     Today's date: 2018  Patient name: Rachel Rodríguez  : 1970  MRN: [id number]  Referring provider: Milagro Anna DPM  Dx:   Encounter Diagnosis     ICD-10-CM    1  Peroneal tendinitis, unspecified laterality M76 70    2  Gait abnormality R26 9    3  Fasciitis M72 9                   Subjective: "It feels better today " Pt denies contraindications for US  Objective: See treatment diary below  Manual  11/20 11/6  11/12 11/15 11/19   PROM - B ankles all planes        IASTM to B Gastroc   25 min                        Held this date  25 min  25 min 25 min 25 min        Exercise Diary  11/20 11/6 11/12 11/15 11/19   NuStep or Bike [facility] L5 x10' [facility] 10' [facility] 10' [facility] 10' L5 [facility] 10' L5    Gastroc towel stretch 30''3x Bilateral  30''3x Bilateral  30"3x b/l 30"3x b/l 30''3x B/L    Ankle AROM - all directions   10x b/l  10x b/l    Ankle isometrics - all directions        Ankle TB - all directions 10x b/l Blue  10x b/l yellow 10x b/l Blue 10x b/l    Towel - Inv/Evr Curls  INV/EV  30x Curls, Inv/Ev 30x  Curls  INV/EV  30x  Curls  INV/EV  30x   HR/TR x20 30x 10x as/pt request x20 x20    Wobbleboard - all directions        Biodex - Limits of Stability        Biodex - Maze        Biodex - Random Control        Standing soleus stretch        Standing gastroc stretch    Pro stretch  30" x3      30" x3 30''3x       30''3x       30"3x      30"3x  30''3x      30''3x        Modalities              US to b/l planter fascia  8' ea  1 MHz, continuous, 1 4 W/cm2                                           Assessment: Tolerated treatment well  Pt has some ecchymoses on b/l lateral gastrocs secondary to IASTM last visit  Held manuals  Performed US to b/l planer fascia with mo adverse affect  Patient demonstrated fatigue post treatment, exhibited good technique with therapeutic exercises and would benefit from continued PT  Continue to monitor ecchymoses  Plan: Continue per plan of care    Progress treatment as tolerated

## 2018-11-27 ENCOUNTER — APPOINTMENT (OUTPATIENT)
Dept: PHYSICAL THERAPY | Facility: CLINIC | Age: 48
End: 2018-11-27
Payer: COMMERCIAL

## 2018-11-29 ENCOUNTER — OFFICE VISIT (OUTPATIENT)
Dept: PHYSICAL THERAPY | Facility: CLINIC | Age: 48
End: 2018-11-29
Payer: COMMERCIAL

## 2018-11-29 DIAGNOSIS — R26.9 GAIT ABNORMALITY: ICD-10-CM

## 2018-11-29 DIAGNOSIS — M72.9 FASCIITIS: ICD-10-CM

## 2018-11-29 DIAGNOSIS — M76.70 PERONEAL TENDINITIS, UNSPECIFIED LATERALITY: Primary | ICD-10-CM

## 2018-11-29 PROCEDURE — 97035 APP MDLTY 1+ULTRASOUND EA 15: CPT

## 2018-11-29 PROCEDURE — 97110 THERAPEUTIC EXERCISES: CPT

## 2018-11-29 NOTE — PROGRESS NOTES
Daily Note     Today's date: 2018  Patient name: Edmond Sharp  : 1970  MRN: [id number]  Referring provider: Mahendra Peralta DPM  Dx:   Encounter Diagnosis     ICD-10-CM    1  Peroneal tendinitis, unspecified laterality M76 70    2  Gait abnormality R26 9    3  Fasciitis M72 9                   Subjective: "I felt great after the US last visit "    Objective: See treatment diary below  Manual  11/20 11/29  11/12 11/15 11/19   PROM - B ankles all planes        IASTM to B Gastroc                          Held this date  Held this date  25 min 25 min 25 min        Exercise Diary  11/20 11/29 11/12 11/15 11/19   NuStep or Bike [facility] L5 x10' [facility] L6 10' [facility] 10' [facility] 10' L5 [facility] 10' L5    Gastroc towel stretch 30''3x Bilateral  30''3x Bilateral  30"3x b/l 30"3x b/l 30''3x B/L    Ankle AROM - all directions   10x b/l  10x b/l    Ankle isometrics - all directions        Ankle TB - all directions 10x b/l Blue  10x b/l yellow 10x b/l Blue 10x b/l    Towel - Inv/Evr Curls  INV/EV  30x Curls, Inv/Ev 30x  Curls  INV/EV  30x  Curls  INV/EV  30x   HR/TR x20 30x 10x as/pt request x20 x20    Wobbleboard - all directions        Biodex - Limits of Stability        Biodex - Maze        Biodex - Random Control        Standing soleus stretch        Standing gastroc stretch    Pro stretch  30" x3      30" x3 30''3x       30''3x       30"3x      30"3x  30''3x      30''3x        Modalities        US to b/l planter fascia  8' ea  1 MHz, continuous, 1 4 W/cm2  8' ea  3 3 MHz, 50%, 1 4 W/cm2                           Assessment: Tolerated treatment well  Patient demonstrated fatigue post treatment, exhibited good technique with therapeutic exercises and would benefit from continued PT  No adverse affect to US this date  Plan: Continue per plan of care  Progress treatment as tolerated

## 2018-12-03 ENCOUNTER — OFFICE VISIT (OUTPATIENT)
Dept: PHYSICAL THERAPY | Facility: CLINIC | Age: 48
End: 2018-12-03
Payer: COMMERCIAL

## 2018-12-03 DIAGNOSIS — R26.9 GAIT ABNORMALITY: ICD-10-CM

## 2018-12-03 DIAGNOSIS — M76.70 PERONEAL TENDINITIS, UNSPECIFIED LATERALITY: Primary | ICD-10-CM

## 2018-12-03 DIAGNOSIS — M72.9 FASCIITIS: ICD-10-CM

## 2018-12-03 PROCEDURE — 97035 APP MDLTY 1+ULTRASOUND EA 15: CPT

## 2018-12-03 PROCEDURE — 97110 THERAPEUTIC EXERCISES: CPT

## 2018-12-03 NOTE — PROGRESS NOTES
Daily Note     Today's date: 12/3/2018  Patient name: Charles John  : 1970  MRN: [id number]  Referring provider: Norma Wilkinson DPM  Dx:   Encounter Diagnosis     ICD-10-CM    1  Peroneal tendinitis, unspecified laterality M76 70    2  Gait abnormality R26 9    3  Fasciitis M72 9                   Subjective: Pt  reports she has pain in both feet today  Feels she might be overdoing it  Pt  reports the US felt great the first time and not so good the last time  Objective: See treatment diary below  Manual  11/20 11/29  12/3 11/15 11/19   PROM - B ankles all planes        IASTM to B Gastroc                          Held this date  Held this date  Held this date 25 min 25 min        Exercise Diary  11/20 11/29 12/3 11/15 11/19   NuStep or Bike [address] [facility] L5 x10' [address] [facility] L6 10' [address] [facility] 10' [address] [facility] 10' L5 [address] [facility] 10' L5    Gastroc towel stretch 30''3x Bilateral  30''3x Bilateral  30"3x b/l 30"3x b/l 30''3x B/L    Ankle AROM - all directions   10x b/l  10x b/l    Ankle isometrics - all directions   MRE's x20 ea     Ankle TB - all directions 10x b/l Blue  10x b/l blue 10x b/l Blue 10x b/l    Towel - Inv/Evr Curls  INV/EV  30x Curls, Inv/Ev 30x  Curls  INV/EV  30x  Curls  INV/EV  30x   HR/TR x20 30x 30x x20 x20    Wobbleboard - all directions        Biodex - Limits of Stability        Biodex - Maze        Biodex - Random Control        Standing soleus stretch        Standing gastroc stretch    Pro stretch  30" x3      30" x3 30''3x       30''3x       30"3x      30"3x  30''3x      30''3x        Modalities  11/20 11/29 12/3     US to b/l planter fascia  8' ea  1 MHz, continuous, 1 4 W/cm2  8' ea  3 3 MHz, 50%, 1 4 W/cm2  8'ea  1MHZ  Continuous  1 4 w/cm2                         Assessment: Tolerated treatment well  Patient exhibited good technique with therapeutic exercises and would benefit from continued PT      Plan: Progress treatment as tolerated

## 2018-12-05 ENCOUNTER — OFFICE VISIT (OUTPATIENT)
Dept: PHYSICAL THERAPY | Facility: CLINIC | Age: 48
End: 2018-12-05
Payer: COMMERCIAL

## 2018-12-05 DIAGNOSIS — M76.70 PERONEAL TENDINITIS, UNSPECIFIED LATERALITY: Primary | ICD-10-CM

## 2018-12-05 DIAGNOSIS — M72.9 FASCIITIS: ICD-10-CM

## 2018-12-05 DIAGNOSIS — R26.9 GAIT ABNORMALITY: ICD-10-CM

## 2018-12-05 PROCEDURE — 97110 THERAPEUTIC EXERCISES: CPT | Performed by: PHYSICAL THERAPIST

## 2018-12-05 PROCEDURE — 97035 APP MDLTY 1+ULTRASOUND EA 15: CPT | Performed by: PHYSICAL THERAPIST

## 2018-12-05 NOTE — PROGRESS NOTES
Daily Note     Today's date: 2018  Patient name: Kate Wiley  : 1970  MRN: [id number]  Referring provider: Nela Hernandez DPM  Dx:   Encounter Diagnosis     ICD-10-CM    1  Peroneal tendinitis, unspecified laterality M76 70    2  Gait abnormality R26 9    3  Fasciitis M72 9                   Subjective: " Im not really sure if the ultrasound is helping at this point, Sometimes it feels better "       Objective: See treatment diary below  Manual  11/20 11/29  12/3 12/5  11/19   PROM - B ankles all planes        IASTM to B Gastroc                          Held this date  Held this date  Held this date Held this date  25 min        Exercise Diary  11/20 11/29 12/3 12/5 11/19   NuStep or Bike [address] [facility] L5 x10' [address] [facility] L6 10' [address] [facility] 10' [address] [facility] 10' L5 3435 [facility] 10' L5    Gastroc towel stretch 30''3x Bilateral  30''3x Bilateral  30"3x b/l 30"3x b/l 30''3x B/L    Ankle AROM - all directions   10x b/l  10x b/l    Ankle isometrics - all directions   MRE's x20 ea     Ankle TB - all directions 10x b/l Blue  10x b/l blue 10x b/l Blue 10x b/l    Towel - Inv/Evr Curls  INV/EV  30x Curls, Inv/Ev 30x  Curls  INV/EV  30x  Curls  INV/EV  30x   HR/TR x20 30x 30x x20 x20    Wobbleboard - all directions        Biodex - Limits of Stability        Biodex - Maze        Biodex - Random Control        Standing soleus stretch        Standing gastroc stretch    Pro stretch  30" x3      30" x3 30''3x       30''3x       30"3x      30"3x 30''3x      30''3x 30''3x      30''3x        Modalities  11/20 11/29 12/3 12/5     US to b/l planter fascia  8' ea  1 MHz, continuous, 1 4 W/cm2  8' ea  3 3 MHz, 50%, 1 4 W/cm2  8'ea  1MHZ  Continuous  1 4 w/cm2 8'ea  1MHZ  Continuous  1 4 w/cm2                          Assessment: Tolerated treatment well  Patient exhibited good technique with therapeutic exercises  Plan: Continue per plan of care

## 2018-12-10 ENCOUNTER — OFFICE VISIT (OUTPATIENT)
Dept: PHYSICAL THERAPY | Facility: CLINIC | Age: 48
End: 2018-12-10
Payer: COMMERCIAL

## 2018-12-10 DIAGNOSIS — R26.9 GAIT ABNORMALITY: ICD-10-CM

## 2018-12-10 DIAGNOSIS — M72.9 FASCIITIS: ICD-10-CM

## 2018-12-10 DIAGNOSIS — M76.70 PERONEAL TENDINITIS, UNSPECIFIED LATERALITY: Primary | ICD-10-CM

## 2018-12-10 PROCEDURE — 97110 THERAPEUTIC EXERCISES: CPT | Performed by: PHYSICAL THERAPIST

## 2018-12-10 PROCEDURE — 97140 MANUAL THERAPY 1/> REGIONS: CPT | Performed by: PHYSICAL THERAPIST

## 2018-12-10 NOTE — PROGRESS NOTES
Daily Note     Today's date: 12/10/2018  Patient name: Lester Ramírez  : 1970  MRN: [id number]  Referring provider: Daniella Parra DPM  Dx:   Encounter Diagnosis     ICD-10-CM    1  Peroneal tendinitis, unspecified laterality M76 70    2  Gait abnormality R26 9    3  Fasciitis M72 9                   Subjective: " I think my feet are doing a little better today "        Objective: See treatment diary below  Manual  11/20 11/29  12/3 12/5  12/10   PROM - B ankles all planes        IASTM to B Gastroc                          Held this date  Held this date  Held this date Held this date  20 min        Exercise Diary  11/20 11/29 12/3 12/5 12/10   NuStep or Bike [address] [facility] L5 x10' [address] [facility] L6 10' [address] [facility] 10' [address] [facility] 10' L5 34[address] [facility] 10' L5    Gastroc towel stretch 30''3x Bilateral  30''3x Bilateral  30"3x b/l 30"3x b/l 30''3x B/L    Ankle AROM - all directions   10x b/l  10x b/l    Ankle isometrics - all directions   MRE's x20 ea     Ankle TB - all directions 10x b/l Blue  10x b/l blue 10x b/l Blue 10x b/l    Towel - Inv/Evr Curls  INV/EV  30x Curls, Inv/Ev 30x  Curls  INV/EV  30x  Curls  INV/EV  30x   HR/TR x20 30x 30x x20 x20    Wobbleboard - all directions        Biodex - Limits of Stability        Biodex - Maze        Biodex - Random Control        Standing soleus stretch        Standing gastroc stretch    Pro stretch  30" x3      30" x3 30''3x       30''3x       30"3x      30"3x 30''3x      30''3x 30''3x      30''3x        Modalities  11/20 11/29 12/3 12/5     US to b/l planter fascia  8' ea  1 MHz, continuous, 1 4 W/cm2  8' ea  3 3 MHz, 50%, 1 4 W/cm2  8'ea  1MHZ  Continuous  1 4 w/cm2 8'ea  1MHZ  Continuous  1 4 w/cm2                        Assessment: Tolerated treatment well  Patient exhibited good technique with therapeutic exercises      Plan: Continue per plan of care

## 2018-12-12 ENCOUNTER — APPOINTMENT (OUTPATIENT)
Dept: PHYSICAL THERAPY | Facility: CLINIC | Age: 48
End: 2018-12-12
Payer: COMMERCIAL

## 2018-12-17 ENCOUNTER — TRANSCRIBE ORDERS (OUTPATIENT)
Dept: PHYSICAL THERAPY | Facility: CLINIC | Age: 48
End: 2018-12-17

## 2018-12-17 ENCOUNTER — EVALUATION (OUTPATIENT)
Dept: PHYSICAL THERAPY | Facility: CLINIC | Age: 48
End: 2018-12-17
Payer: COMMERCIAL

## 2018-12-17 DIAGNOSIS — M76.70 PERONEAL TENDINITIS, UNSPECIFIED LATERALITY: Primary | ICD-10-CM

## 2018-12-17 DIAGNOSIS — M72.9 FIBROMATOSES OF MUSCLE, LIGAMENT, AND FASCIA: ICD-10-CM

## 2018-12-17 DIAGNOSIS — R26.9 ABNORMALITY OF GAIT: ICD-10-CM

## 2018-12-17 DIAGNOSIS — R26.9 GAIT ABNORMALITY: ICD-10-CM

## 2018-12-17 DIAGNOSIS — M72.9 FASCIITIS: ICD-10-CM

## 2018-12-17 PROCEDURE — 97035 APP MDLTY 1+ULTRASOUND EA 15: CPT | Performed by: PHYSICAL THERAPIST

## 2018-12-17 PROCEDURE — 97110 THERAPEUTIC EXERCISES: CPT | Performed by: PHYSICAL THERAPIST

## 2018-12-17 PROCEDURE — G8990 OTHER PT/OT CURRENT STATUS: HCPCS | Performed by: PHYSICAL THERAPIST

## 2018-12-17 PROCEDURE — G8991 OTHER PT/OT GOAL STATUS: HCPCS | Performed by: PHYSICAL THERAPIST

## 2018-12-17 PROCEDURE — 97164 PT RE-EVAL EST PLAN CARE: CPT | Performed by: PHYSICAL THERAPIST

## 2018-12-17 NOTE — LETTER
December 17, 2018    III Susy Andres DPM  [address]  [employer]  [address] [address]    Patient: Amita Padron   YOB: 1970   Date of Visit: 12/17/2018     Encounter Diagnosis     ICD-10-CM    1  Peroneal tendinitis, unspecified laterality M76 70    2  Gait abnormality R26 9    3  Fasciitis M72 9        Dear Dr Contreras Arch:    Please review the attached Plan of Care from [facility] recent visit  Please verify that you agree therapy should continue by signing the attached document and sending it back to our office  If you have any questions or concerns, please don't hesitate to call  Sincerely,    Carmelita Brittle, PT      Referring Provider:      I certify that I have read the below Plan of Care and certify the need for these services furnished under this plan of treatment while under my care  III Susy Andres DPM  [address]  [employer]  [place] [address]  VIA Facsimile: [phone]          PT Re-Evaluation       Assessment  Impairments: abnormal gait, abnormal or restricted ROM, activity intolerance, impaired physical strength, pain with function and poor posture     Assessment details: Amita Padron has demonstrated decreased pain, increased strength, increased range of motion, and increased activity tolerance since starting physical therapy services  They report an overall improvement of 60% thus far  She continues to present with pain, decreased strength, decreased range of motion, and decreased activity tolerance and would benefit from additional skilled physical therapy interventions to address impairments and maximize function  Understanding of Dx/Px/POC: good   Prognosis: good    Goals  1  In 4-6 weeks, patient will demonstrate a decrease in pain to 0/10 during functional activities  2  In 4-6 weeks, patient will demonstrate an increase in range of motion by 5 degrees  3   In 4-6 weeks, patient will demonstrate an increase in strength by 1/2 grade on MMT    1  In 6-8 weeks, patient will be independent with their home exercise program - Met   2  In 6-8 weeks, patient will have zero limitations with strength  3  In 6-8 weeks, patient will have zero limitations with ROM  4  In 6-8 weeks, patient will have zero limitations with ambulation  5  In 6-8 weeks, patient will have zero limitations with stair negotiation    Plan  Patient would benefit from: skilled physical therapy  Planned therapy interventions: manual therapy, therapeutic exercise and home exercise program  Frequency: 2x week  Duration in weeks: 4  Treatment plan discussed with: patient        Subjective Evaluation    History of Present Illness  Date of onset: 2018  Mechanism of injury: The patient is a 51year old female with a chief complaint of bilateral foot pain  She was treated at physical therapy before, for this same condition  She did get relief from therapy  She reports that she has not been keeping up with her exercises at home  " I want to catch this before it gets bad "       RA      The patient reports that she feels 60 percent improved since beginning physical therapy  She reports That the strength and flexibility in her feet have improved   She reports that walking is painful for her at times  " I think it may be time for a new pair of shoes " " I also think that the ultrasound may be helping, I notice a difference when we don't do it "               Pain  Current pain rating: 3  At best pain ratin  At worst pain ratin  Quality: throbbing and dull ache  Relieving factors: ice, relaxation, change in position and support  Aggravating factors: standing and walking    Social Support    Employment status: not working  Hand dominance: right      Diagnostic Tests  No diagnostic tests performed  Treatments  Previous treatment: physical therapy  Current treatment: physical therapy  Patient Goals  Patient goals for therapy: decreased pain, increased strength and increased motion          Objective     Active Range of Motion   Left Ankle/Foot                  12/17   Dorsiflexion (ke): [facility]       WFL  Dorsiflexion (kf): [facility]  Plantar flexion: [facility]           WFL  Inversion: [facility]                   WFL     Right Ankle/Foot              12/17   Dorsiflexion (ke): [facility]      WFL   Dorsiflexion (kf): [facility]       WFL    Plantar flexion: [facility]         WFL    Inversion: [facility]                 WFL    Additional Active Range of Motion Details  Excessive Calcaneal Inversion bilaterally, during static standing  Pain and tenderness to the base of the 5th metatarsal,     Good wear pattern on the bottom of both shoes  Pain with resisted Eversion on right  side, Resisted inversion on left side       Strength/Myotome Testing     Left Ankle/Foot                 12/17       Dorsiflexion: 4+                  4+   Plantar flexion: 4+              4+  Inversion: 4+                      4+  Eversion: 4+                      4+    Right Ankle/Foot   Dorsiflexion: 4+                4+  Plantar flexion: 4+            4+  Inversion: 4+                    4+  Eversion: 4+                    4+    Manual  12/17 11/29  12/3 12/5  12/10   PROM - B ankles all planes        IASTM to B Gastroc                          Held this date  Held this date  Held this date Held this date  20 min        Exercise Diary  12/17 11/29 12/3 12/5 12/10   NuStep or Bike [address] [facility] L5 x10' [address] [facility] L6 10' [address] [facility] 10' [address] [facility] 10' L5 [address] [facility] 10' L5    Gastroc towel stretch 30''3x Bilateral  30''3x Bilateral  30"3x b/l 30"3x b/l 30''3x B/L    Ankle AROM - all directions   10x b/l  10x b/l    Ankle isometrics - all directions   MRE's x20 ea     Ankle TB - all directions 10x b/l Blue  10x b/l blue 10x b/l Blue 10x b/l    Towel - Inv/Evr Curls  INV/EV  30x Curls, Inv/Ev 30x  Curls  INV/EV  30x  Curls  INV/EV  30x   HR/TR x20 30x 30x x20 x20    Wobbleboard - all directions        Biodex - Limits of Stability        Biodex - Maze        Biodex - Random Control        Standing soleus stretch        Standing gastroc stretch    Pro stretch  30" x3      30" x3 30''3x       30''3x       30"3x      30"3x 30''3x      30''3x 30''3x      30''3x        Modalities  12/17 11/29 12/3 12/5     US to b/l planter fascia  8' ea  1 MHz, continuous, 1 4 W/cm2  8' ea  3 3 MHz, 50%, 1 4 W/cm2  8'ea  1MHZ  Continuous  1 4 w/cm2 8'ea  1MHZ  Continuous  1 4 w/cm2

## 2018-12-17 NOTE — PROGRESS NOTES
PT Re-Evaluation       Assessment  Impairments: abnormal gait, abnormal or restricted ROM, activity intolerance, impaired physical strength, pain with function and poor posture     Assessment details: Jeremi Vila has demonstrated decreased pain, increased strength, increased range of motion, and increased activity tolerance since starting physical therapy services  They report an overall improvement of 60% thus far  She continues to present with pain, decreased strength, decreased range of motion, and decreased activity tolerance and would benefit from additional skilled physical therapy interventions to address impairments and maximize function  Understanding of Dx/Px/POC: good   Prognosis: good    Goals  1  In 4-6 weeks, patient will demonstrate a decrease in pain to 0/10 during functional activities  2  In 4-6 weeks, patient will demonstrate an increase in range of motion by 5 degrees  3  In 4-6 weeks, patient will demonstrate an increase in strength by 1/2 grade on MMT    1  In 6-8 weeks, patient will be independent with their home exercise program - Met   2  In 6-8 weeks, patient will have zero limitations with strength  3  In 6-8 weeks, patient will have zero limitations with ROM  4  In 6-8 weeks, patient will have zero limitations with ambulation  5  In 6-8 weeks, patient will have zero limitations with stair negotiation    Plan  Patient would benefit from: skilled physical therapy  Planned therapy interventions: manual therapy, therapeutic exercise and home exercise program  Frequency: 2x week  Duration in weeks: 4  Treatment plan discussed with: patient        Subjective Evaluation    History of Present Illness  Date of onset: 9/1/2018  Mechanism of injury: The patient is a 51year old female with a chief complaint of bilateral foot pain  She was treated at physical therapy before, for this same condition  She did get relief from therapy   She reports that she has not been keeping up with her exercises at home  " I want to catch this before it gets bad "       RA      The patient reports that she feels 60 percent improved since beginning physical therapy  She reports That the strength and flexibility in her feet have improved  She reports that walking is painful for her at times  " I think it may be time for a new pair of shoes " " I also think that the ultrasound may be helping, I notice a difference when we don't do it "               Pain  Current pain rating: 3  At best pain ratin  At worst pain ratin  Quality: throbbing and dull ache  Relieving factors: ice, relaxation, change in position and support  Aggravating factors: standing and walking    Social Support    Employment status: not working  Hand dominance: right      Diagnostic Tests  No diagnostic tests performed  Treatments  Previous treatment: physical therapy  Current treatment: physical therapy  Patient Goals  Patient goals for therapy: decreased pain, increased strength and increased motion          Objective     Active Range of Motion   Left Ankle/Foot                     Dorsiflexion (ke): [facility]       WFL  Dorsiflexion (kf): [facility]  Plantar flexion: [facility]           WFL  Inversion: [facility]                   WFL     Right Ankle/Foot                 Dorsiflexion (ke): [facility]      WFL   Dorsiflexion (kf): [facility]       WFL    Plantar flexion: [facility]         WFL    Inversion: [facility]                 WFL    Additional Active Range of Motion Details  Excessive Calcaneal Inversion bilaterally, during static standing  Pain and tenderness to the base of the 5th metatarsal,     Good wear pattern on the bottom of both shoes  Pain with resisted Eversion on right  side, Resisted inversion on left side       Strength/Myotome Testing     Left Ankle/Foot                        Dorsiflexion: 4+                  4+   Plantar flexion: 4+              4+  Inversion: 4+                      4+  Eversion: 4+ 4+    Right Ankle/Foot   Dorsiflexion: 4+                4+  Plantar flexion: 4+            4+  Inversion: 4+                    4+  Eversion: 4+                    4+    Manual  12/17 11/29  12/3 12/5  12/10   PROM - B ankles all planes        IASTM to B Gastroc                          Held this date  Held this date  Held this date Held this date  20 min        Exercise Diary  12/17 11/29 12/3 12/5 12/10   NuStep or Bike [address] [facility] L5 x10' [address] [facility] L6 10' [address] [facility] [address] 10' [address] [facility] 10' L5 [address] [facility] 10' L5    Gastroc towel stretch 30''3x Bilateral  30''3x Bilateral  30"3x b/l 30"3x b/l 30''3x B/L    Ankle AROM - all directions   10x b/l  10x b/l    Ankle isometrics - all directions   MRE's x20 ea     Ankle TB - all directions 10x b/l Blue  10x b/l blue 10x b/l Blue 10x b/l    Towel - Inv/Evr Curls  INV/EV  30x Curls, Inv/Ev 30x  Curls  INV/EV  30x  Curls  INV/EV  30x   HR/TR x20 30x 30x x20 x20    Wobbleboard - all directions        Biodex - Limits of Stability        Biodex - Maze        Biodex - Random Control        Standing soleus stretch        Standing gastroc stretch    Pro stretch  30" x3      30" x3 30''3x       30''3x       30"3x      30"3x 30''3x      30''3x 30''3x      30''3x        Modalities  12/17 11/29 12/3 12/5     US to b/l planter fascia  8' ea  1 MHz, continuous, 1 4 W/cm2  8' ea  3 3 MHz, 50%, 1 4 W/cm2  8'ea  1MHZ  Continuous  1 4 w/cm2 8'ea  1MHZ  Continuous  1 4 w/cm2

## 2018-12-19 ENCOUNTER — OFFICE VISIT (OUTPATIENT)
Dept: PHYSICAL THERAPY | Facility: CLINIC | Age: 48
End: 2018-12-19
Payer: COMMERCIAL

## 2018-12-19 DIAGNOSIS — R26.9 GAIT ABNORMALITY: ICD-10-CM

## 2018-12-19 DIAGNOSIS — M72.9 FASCIITIS: ICD-10-CM

## 2018-12-19 DIAGNOSIS — M76.70 PERONEAL TENDINITIS, UNSPECIFIED LATERALITY: Primary | ICD-10-CM

## 2018-12-19 PROCEDURE — 97110 THERAPEUTIC EXERCISES: CPT | Performed by: PHYSICAL THERAPIST

## 2018-12-19 PROCEDURE — 97035 APP MDLTY 1+ULTRASOUND EA 15: CPT | Performed by: PHYSICAL THERAPIST

## 2018-12-19 NOTE — PROGRESS NOTES
Daily Note     Today's date: 2018  Patient name: Pan Bashir  : 1970  MRN: [id number]  Referring provider: Rosemary Gonzales DPM  Dx:   Encounter Diagnosis     ICD-10-CM    1  Peroneal tendinitis, unspecified laterality M76 70    2  Gait abnormality R26 9    3  Fasciitis M72 9                   Subjective:   " My feet feel about the same "         Objective: See treatment diary below  Manual  12/17 12/19  12/3 12/5  12/10   PROM - B ankles all planes        IASTM to B Gastroc                          Held this date  Held this date  Held this date Held this date  20 min        Exercise Diary  12/17 12/19  12/3 12/5 12/10   NuStep or Bike [facility] L5 x10' [facility] L6 10' [facility] 10' [facility] 10' L5 [facility] 10' L5    Gastroc towel stretch 30''3x Bilateral  30''3x Bilateral  30"3x b/l 30"3x b/l 30''3x B/L    Ankle AROM - all directions  10x B/L  10x b/l  10x b/l    Ankle isometrics - all directions   MRE's x20 ea     Ankle TB - all directions 10x b/l Blue  10x b/l blue 10x b/l Blue 10x b/l    Towel - Inv/Evr Curls  INV/EV  30x Curls, Inv/Ev 30x  Curls  INV/EV  30x  Curls  INV/EV  30x   HR/TR x20 30x 30x x20 x20    Wobbleboard - all directions        Biodex - Limits of Stability        Biodex - Maze        Biodex - Random Control        Standing soleus stretch        Standing gastroc stretch    Pro stretch  30" x3      30" x3 30''3x       30''3x       30"3x      30"3x 30''3x      30''3x 30''3x      30''3x        Modalities  12/17 12/19 12/3 12/5     US to b/l planter fascia  8' ea  1 MHz, continuous, 1 4 W/cm2  8' ea  3 3 MHz, 50%, 1 4 W/cm2  8'ea  1MHZ  Continuous  1 4 w/cm2 8'ea  1MHZ  Continuous  1 4 w/cm2                              Assessment: Tolerated treatment well  Patient exhibited good technique with therapeutic exercises  Educated patient on her Orthotics, and that they may be causing her to over supinate  Pt has high arches, and is wearing orthotic for arch support   She will ask podiatrist tomorrow  when she sees him  Plan: Continue per plan of care

## 2018-12-20 ENCOUNTER — TRANSCRIBE ORDERS (OUTPATIENT)
Dept: ADMINISTRATIVE | Facility: HOSPITAL | Age: 48
End: 2018-12-20

## 2018-12-20 ENCOUNTER — HOSPITAL ENCOUNTER (OUTPATIENT)
Dept: RADIOLOGY | Facility: HOSPITAL | Age: 48
Discharge: HOME | End: 2018-12-20
Attending: PODIATRIST
Payer: COMMERCIAL

## 2018-12-20 ENCOUNTER — APPOINTMENT (OUTPATIENT)
Dept: LAB | Facility: HOSPITAL | Age: 48
End: 2018-12-20
Attending: PODIATRIST
Payer: COMMERCIAL

## 2018-12-20 DIAGNOSIS — M79.671 RIGHT FOOT PAIN: ICD-10-CM

## 2018-12-20 DIAGNOSIS — M19.90 INFLAMMATORY ARTHRITIS: ICD-10-CM

## 2018-12-20 DIAGNOSIS — M79.672 PAIN IN BOTH FEET: ICD-10-CM

## 2018-12-20 DIAGNOSIS — M79.671 PAIN IN BOTH FEET: ICD-10-CM

## 2018-12-20 DIAGNOSIS — M79.672 LEFT FOOT PAIN: ICD-10-CM

## 2018-12-20 DIAGNOSIS — M79.672 LEFT FOOT PAIN: Primary | ICD-10-CM

## 2018-12-20 LAB
CRP SERPL QL: <3 MG/L
ERYTHROCYTE [SEDIMENTATION RATE] IN BLOOD: 5 MM/HOUR (ref 0–20)

## 2018-12-20 PROCEDURE — 85652 RBC SED RATE AUTOMATED: CPT

## 2018-12-20 PROCEDURE — 73630 X-RAY EXAM OF FOOT: CPT

## 2018-12-20 PROCEDURE — 86430 RHEUMATOID FACTOR TEST QUAL: CPT

## 2018-12-20 PROCEDURE — 86039 ANTINUCLEAR ANTIBODIES (ANA): CPT

## 2018-12-20 PROCEDURE — 86038 ANTINUCLEAR ANTIBODIES: CPT

## 2018-12-20 PROCEDURE — 86140 C-REACTIVE PROTEIN: CPT

## 2018-12-20 PROCEDURE — 36415 COLL VENOUS BLD VENIPUNCTURE: CPT

## 2018-12-21 LAB
ANA HOMOGEN SER QL IF: NORMAL
ANA HOMOGEN TITR SER: NORMAL {TITER}
RHEUMATOID FACT SER QL LA: NEGATIVE
RYE IGE QN: POSITIVE

## 2018-12-26 ENCOUNTER — OFFICE VISIT (OUTPATIENT)
Dept: PHYSICAL THERAPY | Facility: CLINIC | Age: 48
End: 2018-12-26
Payer: COMMERCIAL

## 2018-12-26 DIAGNOSIS — M76.70 PERONEAL TENDINITIS, UNSPECIFIED LATERALITY: Primary | ICD-10-CM

## 2018-12-26 DIAGNOSIS — R26.9 GAIT ABNORMALITY: ICD-10-CM

## 2018-12-26 DIAGNOSIS — M72.9 FASCIITIS: ICD-10-CM

## 2018-12-26 PROCEDURE — 97035 APP MDLTY 1+ULTRASOUND EA 15: CPT | Performed by: PHYSICAL THERAPIST

## 2018-12-26 PROCEDURE — 97110 THERAPEUTIC EXERCISES: CPT | Performed by: PHYSICAL THERAPIST

## 2018-12-26 NOTE — PROGRESS NOTES
Daily Note     Today's date: 2018  Patient name: Glenny Matos  : 1970  MRN: [id number]  Referring provider: Hank Verma DPM  Dx:   Encounter Diagnosis     ICD-10-CM    1  Peroneal tendinitis, unspecified laterality M76 70    2  Gait abnormality R26 9    3  Fasciitis M72 9                   Subjective: Patient reports she saw the foot doctor last Thursday, ordered x-rays and bloodwork  Will discuss results at her f/u appointment on   States that he is also discussing the possibility of having an MRI if approved by insurance  MD also discussed possible custom orthotics for patient in near future       Objective: See treatment diary below    Manual  12/17 12/19  12/26 12/5  12/10   PROM - B ankles all planes        IASTM to B Gastroc                          Held this date  Held this date  Held this date Held this date  20 min        Exercise Diary  12/17 12/19  12/26 12/5 12/10   NuStep or Bike [address] [facility] L5 x10' 3435 [facility] L6 10' 34[address] [facility] L6 10' 34[address] [facility] 10' L5 34[address] [facility] 10' L5    Gastroc towel stretch 30''3x Bilateral  30''3x Bilateral  30"3x b/l 30"3x b/l 30''3x B/L    Ankle AROM - all directions  10x B/L  10x b/l  10x b/l    Ankle isometrics - all directions        Ankle TB - all directions 10x b/l Blue  20x b/l blue 10x b/l Blue 10x b/l    Towel - Inv/Evr Curls  INV/EV  30x Curls, Inv/Ev 30x  Curls  INV/EV  30x  Curls  INV/EV  30x   HR/TR x20 30x 30x x20 x20    Wobbleboard - all directions        Biodex - Limits of Stability        Biodex - Maze        Biodex - Random Control        Standing soleus stretch        Standing gastroc stretch    Pro stretch  30" x3      30" x3 30''3x       30''3x       30"3x      30"3x 30''3x      30''3x 30''3x      30''3x        Modalities       US to b/l planter fascia  8' ea  1 MHz, continuous, 1 4 W/cm2  8' ea  3 3 MHz, 50%, 1 4 W/cm2  8'ea  3 3 MHz, 50%,   1 4 w/cm2 8'ea  1MHZ  Continuous  1 4 w/cm2                        Assessment: Tolerated treatment well  Patient exhibited good technique with therapeutic exercises and would benefit from continued PT      Plan: Continue per plan of care  Progress treatment as tolerated  Patient plans to complete one more PT appointment before the new year, then requested to hold treatment until her f/u with MD for insurance/financial reasons

## 2018-12-31 ENCOUNTER — OFFICE VISIT (OUTPATIENT)
Dept: PHYSICAL THERAPY | Facility: CLINIC | Age: 48
End: 2018-12-31
Payer: COMMERCIAL

## 2018-12-31 DIAGNOSIS — M72.9 FASCIITIS: ICD-10-CM

## 2018-12-31 DIAGNOSIS — R26.9 GAIT ABNORMALITY: ICD-10-CM

## 2018-12-31 DIAGNOSIS — M76.70 PERONEAL TENDINITIS, UNSPECIFIED LATERALITY: Primary | ICD-10-CM

## 2018-12-31 PROCEDURE — 97035 APP MDLTY 1+ULTRASOUND EA 15: CPT | Performed by: PHYSICAL THERAPIST

## 2018-12-31 PROCEDURE — 97110 THERAPEUTIC EXERCISES: CPT | Performed by: PHYSICAL THERAPIST

## 2018-12-31 NOTE — PROGRESS NOTES
Daily Note     Today's date: 2018  Patient name: Amita Padron  : 1970  MRN: [id number]  Referring provider: Vida Moran DPM  Dx:   Encounter Diagnosis     ICD-10-CM    1  Peroneal tendinitis, unspecified laterality M76 70    2  Gait abnormality R26 9    3  Fasciitis M72 9                   Subjective: " I am going to put PT on hold for a little bit, the MD thinks it may be my orthotics "     Objective: See treatment diary below    Manual  12/17 12/19  12/26 12/31 12/10   PROM - B ankles all planes        IASTM to B Gastroc                          Held this date  Held this date  Held this date Held this date  20 min        Exercise Diary  12/17 12/19  12/26 12/31 12/10   NuStep or Bike [address] [facility] L5 x10' [address] [facility] L6 10' [address] [facility] L6 10' [address] [facility] 10' L5 [address] [facility] 10' L5    Gastroc towel stretch 30''3x Bilateral  30''3x Bilateral  30"3x b/l 30"3x b/l 30''3x B/L    Ankle AROM - all directions  10x B/L  10x b/l 10x b/l  10x b/l    Ankle isometrics - all directions        Ankle TB - all directions 10x b/l Blue  20x b/l blue 10x b/l Blue 10x b/l    Towel - Inv/Evr Curls  INV/EV  30x Curls, Inv/Ev 30x  Curls  INV/EV  30x Curls  INV/EV  30x Curls  INV/EV  30x   HR/TR x20 30x 30x x20 x20    Wobbleboard - all directions        Biodex - Limits of Stability        Biodex - Maze        Biodex - Random Control        Standing soleus stretch        Standing gastroc stretch    Pro stretch  30" x3      30" x3 30''3x       30''3x       30"3x      30"3x 30''3x      30''3x 30''3x      30''3x        Modalities      US to b/l planter fascia  8' ea  1 MHz, continuous, 1 4 W/cm2  8' ea  3 3 MHz, 50%, 1 4 W/cm2  8'ea  3 3 MHz, 50%,   1 4 w/cm2 8'ea  1MHZ  Continuous  1 4 w/cm2                          Assessment: Tolerated treatment well  Patient exhibited good technique with therapeutic exercises  Pt will be placed on hold this date, until she follows up with MD regarding B/L foot pain         Plan: Continue per plan of care

## 2019-02-15 NOTE — PROGRESS NOTES
At this time, patient has achieved their maximum functional benefit from skilled physical therapy services and will be discharged to their [facility]  Patient is in agreement with the plan of care    As a result, patient is discharged from physical therapy

## 2021-06-28 NOTE — PROGRESS NOTES
Daily Note     Today's date: 2018  Patient name: Pan Bashir  : 1970   MRN: [id number]  Referring provider: Rosemary Gonzales DPM  Dx:   Encounter Diagnosis     ICD-10-CM    1  Peroneal tendinitis, unspecified laterality M76 70    2  Gait abnormality R26 9    3  Fasciitis M72 9                   Subjective: "My feet are really hurting today "       Objective: See treatment diary below    Manual  11/1 11/6  11/12 11/15 11/19   PROM - B ankles all planes        IASTM to B Gastroc   25 min                        15 min  25 min  25 min 25 min 25 min        Exercise Diary   11/6 11/12 11/15 11/19   NuStep or Bike  [address] [facility] 10' [address] [facility] 10' [address] [facility] 10' L5 [address] [facility] 10' L5    Gastroc towel stretch  30''3x Bilateral  30"3x b/l 30"3x b/l 30''3x B/L    Ankle AROM - all directions   10x b/l  10x b/l    Ankle isometrics - all directions        Ankle TB - all directions   10x b/l yellow 10x b/l Blue 10x b/l    Towel - Inv/Evr  Curls, Inv/Ev 30x  Curls  INV/EV  30x  Curls  INV/EV  30x   HR/TR  30x 10x as/pt request x20 x20    Wobbleboard - all directions        Biodex - Limits of Stability        Biodex - Maze        Biodex - Random Control        Standing soleus stretch        Standing gastroc stretch    Pro stretch   30''3x       30''3x       30"3x      30"3x  30''3x      30''3x        Modalities                                                         Assessment: Tolerated treatment well  Patient exhibited good technique with therapeutic exercises  Pt presents with continued restrictions in her bilateral Gastroc during manual therapy  Plan: Continue per plan of care 
How Severe Is Your Skin Lesion?: moderate
Interval History:  Patient received Zyprexa 5mg IM for agitation at 1554 yesterday. Patient also received haldol 5mg IV x 1 in PACU at 19:43 and later benadryl 1mg/kg IV x 1 at 22:46pm due to concern for dystonic reaction (patient with claw-like posturing of hands and intermittent rigidity of b/l upper and lower extremities).  Patient later patient was ambulating around the room but incontient of urine. Overnight, past 1am, patient began to have tonic seizures requiring intubation.    Patient in PICU, intubated and sedated today. Unable to perform interview or meaningful psychiatric exam.  Psychotherapeutics     Start     Stop Route Frequency Ordered    07/01/17 1950  dexmedetomidine (PRECEDEX) 400mcg/100mL 0.9% NaCL infusion (non-titrating)      -- IV Continuous 07/01/17 1954    07/01/17 0854  lorazepam injection 2.2 mg      -- IV Every 10 min PRN 07/01/17 0754    07/01/17 0127  lorazepam (ATIVAN) 2 mg/mL injection     Comments:  Created by cabinet override    07/01 1344   07/01/17 0127    06/29/17 2152  olanzapine injection 5 mg      06/29 2359 IM Once as needed 06/29/17 2239           Review of Systems  Objective:     Vital Signs (Most Recent):  Temp: 99.1 °F (37.3 °C) (07/01/17 1915)  Pulse: (!) 56 (07/01/17 2108)  Resp: 10 (07/01/17 2108)  BP: 115/82 (07/01/17 1915)  SpO2: 100 % (07/01/17 2108) Vital Signs (24h Range):  Temp:  [97.7 °F (36.5 °C)-100.6 °F (38.1 °C)] 99.1 °F (37.3 °C)  Pulse:  [] 56  Resp:  [10-29] 10  SpO2:  [35 %-100 %] 100 %  BP: ()/() 115/82        Weight: 43.9 kg (96 lb 12.5 oz)  There is no height or weight on file to calculate BMI.      Intake/Output Summary (Last 24 hours) at 07/01/17 2121  Last data filed at 07/01/17 1900   Gross per 24 hour   Intake          1725.79 ml   Output             1037 ml   Net           688.79 ml       Physical Exam  General Appearance: lying in bed, intubated, sedated  Abnormal Involuntary Movements: none noted  Level of Consciousness: 
Have Your Skin Lesions Been Treated?: not been treated
sedated, not responsive to verbal stimuli  Orientation: unable to assess  Grooming: fair  Psychomotor Behavior: still due to dsedation  Speech: unable to assess  Language: unable to assess  Mood: unable to assess  Affect: unable to assess  Thought Process: unable to assess  Associations: unable to assess  Thought Content: unable to assess  Memory: unable to assess  Attention: unable to assess  Insight: unable to assess  Judgment: unable to assess          Significant Labs:   Last 24 Hours:   Recent Lab Results       07/01/17 2037 07/01/17 2036 07/01/17  1620 07/01/17  1620 07/01/17  1354      Time Notifed:   1621       Provider Notified:   KEELEY       Verbal Result Readback Performed   Yes       Albumin 3.4         Alkaline Phosphatase 200         Allens Test  N/A N/A       ALT 9(L)         Anion Gap 11         Aniso          Appearance, UA          AST 15         Baso #          Basophil%          Bilirubin (UA)          Total Bilirubin 0.4  Comment:  For infants and newborns, interpretation of results should be based  on gestational age, weight and in agreement with clinical  observations.  Premature Infant recommended reference ranges:  Up to 24 hours.............<8.0 mg/dL  Up to 48 hours............<12.0 mg/dL  3-5 days..................<15.0 mg/dL  6-29 days.................<15.0 mg/dL           Blood Culture, Routine     No Growth to date[P]     Site  Aneta/UAC Aneta/UAC       BUN, Bld 10         Calcium 9.4         Chloride 105         CO2 21(L)         Color, UA          CPK          Creatinine 0.8         Dels  Ped Vent Adult Vent       Differential Method          eGFR if  SEE COMMENT         eGFR if non  SEE COMMENT  Comment:  Calculation used to obtain the estimated glomerular filtration  rate (eGFR) is the CKD-EPI equation. Since race is unknown   in our information system, the eGFR values for   -American and Non--American patients are given   for each 
Is This A New Presentation, Or A Follow-Up?: Skin Lesions
creatinine result.  Test not performed.  GFR calculation is only valid for patients   18 and older.           Eos #          Eosinophil%          ETCO2   37       FiO2  35 40       Flow          Glucose 104         Glucose, UA          Gran%          Hematocrit          Hemoglobin          Ketones, UA          Leukocytes, UA          Lymph #          Lymph%          Magnesium 2.1         MCH          MCHC          MCV          Min Vol   5.4       Mode  AC/PRVC AC/PRVC       Mono #          Mono%          MPV          Myelocytes          Nitrite, UA          Occult Blood UA          PEEP  5 5       pH, UA          Phosphorus 6.1(H)         PiP   21       Platelet Estimate          Platelets          POC BE  -1 -1       POC HCO3  23.9(L) 23.4(L)       POC Hematocrit  36 34(L)       POC Ionized Calcium  1.23 1.22       POC Lactate          POC PCO2  36.6 35.5       POC PH  7.422 7.426       POC PO2  179(H) 208(H)       POC Potassium  3.9 3.6       POC SATURATED O2  100 100       POC Sodium  140 140       POC TCO2  25 24       POCT Glucose    92      Potassium 3.9         Total Protein 8.7(H)         Protein, UA          Provider Credentials:   MD       Rate  10 12       RBC          RDW          Sample  ARTERIAL ARTERIAL       Sodium 137         Sp02  100 100       Specific [place], UA          Specimen UA          Urobilinogen, UA          Vt  400 400       WBC                      07/01/17  1240 07/01/17  1235 07/01/17  1229 07/01/17  0838 07/01/17  0452      Time Notifed:   1230 839      Provider Notified:   KEELEY MINA      Verbal Result Readback Performed   Yes Yes      Albumin          Alkaline Phosphatase          Allens Test   N/A N/A N/A     ALT          Anion Gap          Aniso          Appearance, UA Clear         AST          Baso #          Basophil%          Bilirubin (UA) Negative         Total Bilirubin          Blood Culture, Routine  No Growth to date[P]        Site   Aneta/UAC Aneta/[facility] Aneta/[facility]     
Additional History: Patient lives in [place] and is down visiting family in [place] and wanted to be seen about lesion
BUN, Bld          Calcium          Chloride          CO2          Color, UA Yellow         CPK          Creatinine          DelSys   Adult Vent Ped Vent Ped Vent     Differential Method          eGFR if           eGFR if non           Eos #          Eosinophil%          ETCO2   31 30 38     FiO2   40 40 50     Flow          Glucose          Glucose, UA Negative         Gran%          Hematocrit          Hemoglobin          Ketones, UA 1+(A)         Leukocytes, UA Negative         Lymph #          Lymph%          Magnesium          MCH          MCHC          MCV          Min Vol   7.2 9.6      Mode   AC/PRVC AC/PRVC AC/PRVC     Mono #          Mono%          MPV          Myelocytes          Nitrite, UA Negative         Occult Blood UA Negative         PEEP   5 5 5     pH, UA 5.0         Phosphorus          PiP   23 22      Platelet Estimate          Platelets          POC BE   0 -3 -2     POC HCO3   23.2(L) 20.3(L) 22.7(L)     POC Hematocrit   35(L) 37 37     POC Ionized Calcium   1.15 1.16 1.22     POC Lactate          POC PCO2   30.5(L) 26.8(LL) 36.2     POC PH   7.489(H) 7.487(H) 7.405     POC PO2   214(H) 212(H) 260(H)     POC Potassium   3.6 3.6 3.9     POC SATURATED O2   100 100 100     POC Sodium   139 138 138     POC TCO2   24 21(L) 24     POCT Glucose          Potassium          Total Protein          Protein, UA Negative  Comment:  Recommend a 24 hour urine protein or a urine   protein/creatinine ratio if globulin induced proteinuria is  clinically suspected.           Provider Credentials:   MD LUND      Rate   16 20 20     RBC          RDW          Sample   ARTERIAL ARTERIAL ARTERIAL     Sodium          Sp02   100 100 100     Specific [place], UA 1.010         Specimen UA Urine, Catheterized         Urobilinogen, UA Negative         Vt   400 400 400     WBC                      07/01/17  0226 07/01/17  0213 07/01/17  0206 07/01/17  0132      Time Notifed:         Provider 
Notified:         Verbal Result Readback Performed         Albumin 3.9        Alkaline Phosphatase 227        Allens Test  Pass Pass      ALT 10        Anion Gap 10        Aniso CANCELED  Comment:  Result canceled by the ancillary        Appearance, UA         AST 15        Baso # CANCELED  Comment:  Result canceled by the ancillary        Basophil% 0.0        Bilirubin (UA)         Total Bilirubin 0.6  Comment:  For infants and newborns, interpretation of results should be based  on gestational age, weight and in agreement with clinical  observations.  Premature Infant recommended reference ranges:  Up to 24 hours.............<8.0 mg/dL  Up to 48 hours............<12.0 mg/dL  3-5 days..................<15.0 mg/dL  6-29 days.................<15.0 mg/dL          Blood Culture, Routine         Site  RR RR      BUN, Bld 11        Calcium 9.1        Chloride 102        CO2 26        Color, UA         (H)        Creatinine 0.9        DelSys  NRB NRB      Differential Method Manual  Comment:  Corrected result; previously reported as Automated on 07/01/2017 at   03:53.  [C]        eGFR if  SEE COMMENT        eGFR if non  SEE COMMENT  Comment:  Calculation used to obtain the estimated glomerular filtration  rate (eGFR) is the CKD-EPI equation. Since race is unknown   in our information system, the eGFR values for   -American and Non--American patients are given   for each creatinine result.  Test not performed.  GFR calculation is only valid for patients   18 and older.          Eos # CANCELED  Comment:  Result canceled by the ancillary        Eosinophil% 0.0        ETCO2         FiO2   100      Flow   15      Glucose 143(H)        Glucose, UA         Gran% 68.0(H)        Hematocrit 36.2(L)        Hemoglobin 13.2        Ketones, UA         Leukocytes, UA         Lymph # CANCELED  Comment:  Result canceled by the ancillary        Lymph% 25.0(L)        Magnesium 2.3        
MCH 26.7        MCHC 36.5        MCV 73(L)        Min Vol         Mode  SPONT SPONT      Mono # CANCELED  Comment:  Result canceled by the ancillary        Mono% 6.0        MPV 9.4        Myelocytes 1.0        Nitrite, UA         Occult Blood UA         PEEP         pH, UA         Phosphorus 5.6(H)        PiP         Platelet Estimate Increased(A)        Platelets 446(H)        POC BE   1      POC HCO3   27.2      POC Hematocrit   34(L)      POC Ionized Calcium   1.24      POC Lactate  2.50(H)       POC PCO2   50.6(H)      POC PH   7.338(L)      POC PO2   600(H)      POC Potassium   3.7      POC SATURATED O2   100      POC Sodium   137      POC TCO2   29(H)      POCT Glucose    122(H)     Potassium 3.7        Total Protein 7.6        Protein, UA         Provider Credentials:         Rate         RBC 4.95        RDW 13.5        Sample  ARTERIAL ARTERIAL      Sodium 138        Sp02   100      Specific [place], UA         Specimen UA         Urobilinogen, UA         Vt         WBC 19.65(H)

## 2021-10-12 ENCOUNTER — EVALUATION (OUTPATIENT)
Dept: OCCUPATIONAL THERAPY | Facility: CLINIC | Age: 51
End: 2021-10-12
Payer: COMMERCIAL

## 2021-10-12 DIAGNOSIS — S52.601D CLOSED FRACTURE OF RIGHT DISTAL RADIUS AND ULNA, WITH ROUTINE HEALING, SUBSEQUENT ENCOUNTER: Primary | ICD-10-CM

## 2021-10-12 DIAGNOSIS — S52.501D CLOSED FRACTURE OF RIGHT DISTAL RADIUS AND ULNA, WITH ROUTINE HEALING, SUBSEQUENT ENCOUNTER: Primary | ICD-10-CM

## 2021-10-12 PROCEDURE — 97535 SELF CARE MNGMENT TRAINING: CPT

## 2021-10-12 PROCEDURE — 97140 MANUAL THERAPY 1/> REGIONS: CPT

## 2021-10-12 PROCEDURE — 97166 OT EVAL MOD COMPLEX 45 MIN: CPT

## 2021-10-15 ENCOUNTER — OFFICE VISIT (OUTPATIENT)
Dept: OCCUPATIONAL THERAPY | Facility: CLINIC | Age: 51
End: 2021-10-15
Payer: COMMERCIAL

## 2021-10-15 DIAGNOSIS — S52.501D CLOSED FRACTURE OF RIGHT DISTAL RADIUS AND ULNA, WITH ROUTINE HEALING, SUBSEQUENT ENCOUNTER: Primary | ICD-10-CM

## 2021-10-15 DIAGNOSIS — S52.601D CLOSED FRACTURE OF RIGHT DISTAL RADIUS AND ULNA, WITH ROUTINE HEALING, SUBSEQUENT ENCOUNTER: Primary | ICD-10-CM

## 2021-10-15 PROCEDURE — 97140 MANUAL THERAPY 1/> REGIONS: CPT

## 2021-10-15 PROCEDURE — 97110 THERAPEUTIC EXERCISES: CPT

## 2021-10-15 PROCEDURE — 97530 THERAPEUTIC ACTIVITIES: CPT

## 2021-10-18 ENCOUNTER — APPOINTMENT (OUTPATIENT)
Dept: OCCUPATIONAL THERAPY | Facility: CLINIC | Age: 51
End: 2021-10-18
Payer: COMMERCIAL

## 2021-10-20 ENCOUNTER — OFFICE VISIT (OUTPATIENT)
Dept: OCCUPATIONAL THERAPY | Facility: CLINIC | Age: 51
End: 2021-10-20
Payer: COMMERCIAL

## 2021-10-20 DIAGNOSIS — S52.501D CLOSED FRACTURE OF RIGHT DISTAL RADIUS AND ULNA, WITH ROUTINE HEALING, SUBSEQUENT ENCOUNTER: Primary | ICD-10-CM

## 2021-10-20 DIAGNOSIS — S52.601D CLOSED FRACTURE OF RIGHT DISTAL RADIUS AND ULNA, WITH ROUTINE HEALING, SUBSEQUENT ENCOUNTER: Primary | ICD-10-CM

## 2021-10-20 PROCEDURE — 97110 THERAPEUTIC EXERCISES: CPT

## 2021-10-20 PROCEDURE — 97140 MANUAL THERAPY 1/> REGIONS: CPT

## 2021-10-25 ENCOUNTER — OFFICE VISIT (OUTPATIENT)
Dept: OCCUPATIONAL THERAPY | Facility: CLINIC | Age: 51
End: 2021-10-25
Payer: COMMERCIAL

## 2021-10-25 DIAGNOSIS — S52.501D CLOSED FRACTURE OF RIGHT DISTAL RADIUS AND ULNA, WITH ROUTINE HEALING, SUBSEQUENT ENCOUNTER: Primary | ICD-10-CM

## 2021-10-25 DIAGNOSIS — S52.601D CLOSED FRACTURE OF RIGHT DISTAL RADIUS AND ULNA, WITH ROUTINE HEALING, SUBSEQUENT ENCOUNTER: Primary | ICD-10-CM

## 2021-10-25 PROCEDURE — 97110 THERAPEUTIC EXERCISES: CPT

## 2021-10-25 PROCEDURE — 97014 ELECTRIC STIMULATION THERAPY: CPT

## 2021-10-25 PROCEDURE — 97140 MANUAL THERAPY 1/> REGIONS: CPT

## 2021-10-27 ENCOUNTER — OFFICE VISIT (OUTPATIENT)
Dept: OCCUPATIONAL THERAPY | Facility: CLINIC | Age: 51
End: 2021-10-27
Payer: COMMERCIAL

## 2021-10-27 DIAGNOSIS — S52.601D CLOSED FRACTURE OF RIGHT DISTAL RADIUS AND ULNA, WITH ROUTINE HEALING, SUBSEQUENT ENCOUNTER: Primary | ICD-10-CM

## 2021-10-27 DIAGNOSIS — S52.501D CLOSED FRACTURE OF RIGHT DISTAL RADIUS AND ULNA, WITH ROUTINE HEALING, SUBSEQUENT ENCOUNTER: Primary | ICD-10-CM

## 2021-10-27 PROCEDURE — 97110 THERAPEUTIC EXERCISES: CPT

## 2021-10-27 PROCEDURE — 97140 MANUAL THERAPY 1/> REGIONS: CPT

## 2021-11-01 ENCOUNTER — OFFICE VISIT (OUTPATIENT)
Dept: OCCUPATIONAL THERAPY | Facility: CLINIC | Age: 51
End: 2021-11-01
Payer: COMMERCIAL

## 2021-11-01 DIAGNOSIS — S52.601D CLOSED FRACTURE OF RIGHT DISTAL RADIUS AND ULNA, WITH ROUTINE HEALING, SUBSEQUENT ENCOUNTER: Primary | ICD-10-CM

## 2021-11-01 DIAGNOSIS — S52.501D CLOSED FRACTURE OF RIGHT DISTAL RADIUS AND ULNA, WITH ROUTINE HEALING, SUBSEQUENT ENCOUNTER: Primary | ICD-10-CM

## 2021-11-01 PROCEDURE — 97110 THERAPEUTIC EXERCISES: CPT

## 2021-11-01 PROCEDURE — 97140 MANUAL THERAPY 1/> REGIONS: CPT

## 2021-11-03 ENCOUNTER — OFFICE VISIT (OUTPATIENT)
Dept: OCCUPATIONAL THERAPY | Facility: CLINIC | Age: 51
End: 2021-11-03
Payer: COMMERCIAL

## 2021-11-03 DIAGNOSIS — S52.601D CLOSED FRACTURE OF RIGHT DISTAL RADIUS AND ULNA, WITH ROUTINE HEALING, SUBSEQUENT ENCOUNTER: Primary | ICD-10-CM

## 2021-11-03 DIAGNOSIS — S52.501D CLOSED FRACTURE OF RIGHT DISTAL RADIUS AND ULNA, WITH ROUTINE HEALING, SUBSEQUENT ENCOUNTER: Primary | ICD-10-CM

## 2021-11-03 PROCEDURE — 97140 MANUAL THERAPY 1/> REGIONS: CPT

## 2021-11-03 PROCEDURE — 97110 THERAPEUTIC EXERCISES: CPT

## 2021-11-08 ENCOUNTER — OFFICE VISIT (OUTPATIENT)
Dept: OCCUPATIONAL THERAPY | Facility: CLINIC | Age: 51
End: 2021-11-08
Payer: COMMERCIAL

## 2021-11-08 DIAGNOSIS — S52.601D CLOSED FRACTURE OF RIGHT DISTAL RADIUS AND ULNA, WITH ROUTINE HEALING, SUBSEQUENT ENCOUNTER: Primary | ICD-10-CM

## 2021-11-08 DIAGNOSIS — S52.501D CLOSED FRACTURE OF RIGHT DISTAL RADIUS AND ULNA, WITH ROUTINE HEALING, SUBSEQUENT ENCOUNTER: Primary | ICD-10-CM

## 2021-11-08 PROCEDURE — 97110 THERAPEUTIC EXERCISES: CPT

## 2021-11-08 PROCEDURE — 97140 MANUAL THERAPY 1/> REGIONS: CPT

## 2021-11-10 ENCOUNTER — APPOINTMENT (OUTPATIENT)
Dept: OCCUPATIONAL THERAPY | Facility: CLINIC | Age: 51
End: 2021-11-10
Payer: COMMERCIAL

## 2021-11-15 ENCOUNTER — OFFICE VISIT (OUTPATIENT)
Dept: OCCUPATIONAL THERAPY | Facility: CLINIC | Age: 51
End: 2021-11-15
Payer: COMMERCIAL

## 2021-11-15 DIAGNOSIS — S52.501D CLOSED FRACTURE OF RIGHT DISTAL RADIUS AND ULNA, WITH ROUTINE HEALING, SUBSEQUENT ENCOUNTER: Primary | ICD-10-CM

## 2021-11-15 DIAGNOSIS — S52.601D CLOSED FRACTURE OF RIGHT DISTAL RADIUS AND ULNA, WITH ROUTINE HEALING, SUBSEQUENT ENCOUNTER: Primary | ICD-10-CM

## 2021-11-15 PROCEDURE — 97110 THERAPEUTIC EXERCISES: CPT

## 2021-11-17 ENCOUNTER — OFFICE VISIT (OUTPATIENT)
Dept: OCCUPATIONAL THERAPY | Facility: CLINIC | Age: 51
End: 2021-11-17
Payer: COMMERCIAL

## 2021-11-17 DIAGNOSIS — S52.501D CLOSED FRACTURE OF RIGHT DISTAL RADIUS AND ULNA, WITH ROUTINE HEALING, SUBSEQUENT ENCOUNTER: Primary | ICD-10-CM

## 2021-11-17 DIAGNOSIS — S52.601D CLOSED FRACTURE OF RIGHT DISTAL RADIUS AND ULNA, WITH ROUTINE HEALING, SUBSEQUENT ENCOUNTER: Primary | ICD-10-CM

## 2021-11-17 PROCEDURE — 97140 MANUAL THERAPY 1/> REGIONS: CPT

## 2021-11-17 PROCEDURE — 97110 THERAPEUTIC EXERCISES: CPT

## 2021-11-22 ENCOUNTER — APPOINTMENT (OUTPATIENT)
Dept: OCCUPATIONAL THERAPY | Facility: CLINIC | Age: 51
End: 2021-11-22
Payer: COMMERCIAL

## 2021-11-23 ENCOUNTER — APPOINTMENT (OUTPATIENT)
Dept: OCCUPATIONAL THERAPY | Facility: CLINIC | Age: 51
End: 2021-11-23
Payer: COMMERCIAL

## 2021-11-24 ENCOUNTER — OFFICE VISIT (OUTPATIENT)
Dept: OCCUPATIONAL THERAPY | Facility: CLINIC | Age: 51
End: 2021-11-24
Payer: COMMERCIAL

## 2021-11-24 DIAGNOSIS — S52.601D CLOSED FRACTURE OF RIGHT DISTAL RADIUS AND ULNA, WITH ROUTINE HEALING, SUBSEQUENT ENCOUNTER: Primary | ICD-10-CM

## 2021-11-24 DIAGNOSIS — S52.501D CLOSED FRACTURE OF RIGHT DISTAL RADIUS AND ULNA, WITH ROUTINE HEALING, SUBSEQUENT ENCOUNTER: Primary | ICD-10-CM

## 2021-11-24 PROCEDURE — 97168 OT RE-EVAL EST PLAN CARE: CPT

## 2021-11-24 PROCEDURE — 97110 THERAPEUTIC EXERCISES: CPT

## 2021-11-29 ENCOUNTER — OFFICE VISIT (OUTPATIENT)
Dept: OCCUPATIONAL THERAPY | Facility: CLINIC | Age: 51
End: 2021-11-29
Payer: COMMERCIAL

## 2021-11-29 DIAGNOSIS — S52.601D CLOSED FRACTURE OF RIGHT DISTAL RADIUS AND ULNA, WITH ROUTINE HEALING, SUBSEQUENT ENCOUNTER: Primary | ICD-10-CM

## 2021-11-29 DIAGNOSIS — S52.501D CLOSED FRACTURE OF RIGHT DISTAL RADIUS AND ULNA, WITH ROUTINE HEALING, SUBSEQUENT ENCOUNTER: Primary | ICD-10-CM

## 2021-11-29 PROCEDURE — 97140 MANUAL THERAPY 1/> REGIONS: CPT

## 2021-11-29 PROCEDURE — 97110 THERAPEUTIC EXERCISES: CPT

## 2021-12-01 ENCOUNTER — OFFICE VISIT (OUTPATIENT)
Dept: OCCUPATIONAL THERAPY | Facility: CLINIC | Age: 51
End: 2021-12-01
Payer: COMMERCIAL

## 2021-12-01 DIAGNOSIS — S52.601D CLOSED FRACTURE OF RIGHT DISTAL RADIUS AND ULNA, WITH ROUTINE HEALING, SUBSEQUENT ENCOUNTER: Primary | ICD-10-CM

## 2021-12-01 DIAGNOSIS — S52.501D CLOSED FRACTURE OF RIGHT DISTAL RADIUS AND ULNA, WITH ROUTINE HEALING, SUBSEQUENT ENCOUNTER: Primary | ICD-10-CM

## 2021-12-01 PROCEDURE — 97110 THERAPEUTIC EXERCISES: CPT

## 2021-12-01 PROCEDURE — 97140 MANUAL THERAPY 1/> REGIONS: CPT

## 2021-12-06 ENCOUNTER — OFFICE VISIT (OUTPATIENT)
Dept: OCCUPATIONAL THERAPY | Facility: CLINIC | Age: 51
End: 2021-12-06
Payer: COMMERCIAL

## 2021-12-06 DIAGNOSIS — S52.601D CLOSED FRACTURE OF RIGHT DISTAL RADIUS AND ULNA, WITH ROUTINE HEALING, SUBSEQUENT ENCOUNTER: Primary | ICD-10-CM

## 2021-12-06 DIAGNOSIS — S52.501D CLOSED FRACTURE OF RIGHT DISTAL RADIUS AND ULNA, WITH ROUTINE HEALING, SUBSEQUENT ENCOUNTER: Primary | ICD-10-CM

## 2021-12-06 PROCEDURE — 97535 SELF CARE MNGMENT TRAINING: CPT

## 2021-12-06 PROCEDURE — 97110 THERAPEUTIC EXERCISES: CPT

## 2021-12-06 PROCEDURE — 97140 MANUAL THERAPY 1/> REGIONS: CPT

## 2021-12-08 ENCOUNTER — OFFICE VISIT (OUTPATIENT)
Dept: OCCUPATIONAL THERAPY | Facility: CLINIC | Age: 51
End: 2021-12-08
Payer: COMMERCIAL

## 2021-12-08 DIAGNOSIS — S52.601D CLOSED FRACTURE OF RIGHT DISTAL RADIUS AND ULNA, WITH ROUTINE HEALING, SUBSEQUENT ENCOUNTER: Primary | ICD-10-CM

## 2021-12-08 DIAGNOSIS — S52.501D CLOSED FRACTURE OF RIGHT DISTAL RADIUS AND ULNA, WITH ROUTINE HEALING, SUBSEQUENT ENCOUNTER: Primary | ICD-10-CM

## 2021-12-08 PROCEDURE — 97110 THERAPEUTIC EXERCISES: CPT

## 2021-12-08 PROCEDURE — 97140 MANUAL THERAPY 1/> REGIONS: CPT

## 2021-12-13 ENCOUNTER — OFFICE VISIT (OUTPATIENT)
Dept: OCCUPATIONAL THERAPY | Facility: CLINIC | Age: 51
End: 2021-12-13
Payer: COMMERCIAL

## 2021-12-13 DIAGNOSIS — S52.601D CLOSED FRACTURE OF RIGHT DISTAL RADIUS AND ULNA, WITH ROUTINE HEALING, SUBSEQUENT ENCOUNTER: Primary | ICD-10-CM

## 2021-12-13 DIAGNOSIS — S52.501D CLOSED FRACTURE OF RIGHT DISTAL RADIUS AND ULNA, WITH ROUTINE HEALING, SUBSEQUENT ENCOUNTER: Primary | ICD-10-CM

## 2021-12-13 PROCEDURE — 97110 THERAPEUTIC EXERCISES: CPT

## 2021-12-13 PROCEDURE — 97140 MANUAL THERAPY 1/> REGIONS: CPT

## 2021-12-15 ENCOUNTER — OFFICE VISIT (OUTPATIENT)
Dept: OCCUPATIONAL THERAPY | Facility: CLINIC | Age: 51
End: 2021-12-15
Payer: COMMERCIAL

## 2021-12-15 DIAGNOSIS — S52.501D CLOSED FRACTURE OF RIGHT DISTAL RADIUS AND ULNA, WITH ROUTINE HEALING, SUBSEQUENT ENCOUNTER: Primary | ICD-10-CM

## 2021-12-15 DIAGNOSIS — S52.601D CLOSED FRACTURE OF RIGHT DISTAL RADIUS AND ULNA, WITH ROUTINE HEALING, SUBSEQUENT ENCOUNTER: Primary | ICD-10-CM

## 2021-12-15 PROCEDURE — 97110 THERAPEUTIC EXERCISES: CPT

## 2021-12-20 ENCOUNTER — OFFICE VISIT (OUTPATIENT)
Dept: OCCUPATIONAL THERAPY | Facility: CLINIC | Age: 51
End: 2021-12-20
Payer: COMMERCIAL

## 2021-12-20 DIAGNOSIS — S52.601D CLOSED FRACTURE OF RIGHT DISTAL RADIUS AND ULNA, WITH ROUTINE HEALING, SUBSEQUENT ENCOUNTER: Primary | ICD-10-CM

## 2021-12-20 DIAGNOSIS — S52.501D CLOSED FRACTURE OF RIGHT DISTAL RADIUS AND ULNA, WITH ROUTINE HEALING, SUBSEQUENT ENCOUNTER: Primary | ICD-10-CM

## 2021-12-20 PROCEDURE — 97140 MANUAL THERAPY 1/> REGIONS: CPT

## 2021-12-20 PROCEDURE — 97110 THERAPEUTIC EXERCISES: CPT

## 2021-12-22 ENCOUNTER — APPOINTMENT (OUTPATIENT)
Dept: OCCUPATIONAL THERAPY | Facility: CLINIC | Age: 51
End: 2021-12-22
Payer: COMMERCIAL

## 2021-12-27 ENCOUNTER — APPOINTMENT (OUTPATIENT)
Dept: OCCUPATIONAL THERAPY | Facility: CLINIC | Age: 51
End: 2021-12-27
Payer: COMMERCIAL

## 2021-12-29 ENCOUNTER — EVALUATION (OUTPATIENT)
Dept: OCCUPATIONAL THERAPY | Facility: CLINIC | Age: 51
End: 2021-12-29
Payer: COMMERCIAL

## 2021-12-29 DIAGNOSIS — S52.601D CLOSED FRACTURE OF RIGHT DISTAL RADIUS AND ULNA, WITH ROUTINE HEALING, SUBSEQUENT ENCOUNTER: Primary | ICD-10-CM

## 2021-12-29 DIAGNOSIS — S52.501D CLOSED FRACTURE OF RIGHT DISTAL RADIUS AND ULNA, WITH ROUTINE HEALING, SUBSEQUENT ENCOUNTER: Primary | ICD-10-CM

## 2021-12-29 PROCEDURE — 97140 MANUAL THERAPY 1/> REGIONS: CPT

## 2021-12-29 PROCEDURE — 97110 THERAPEUTIC EXERCISES: CPT

## 2021-12-29 PROCEDURE — 97168 OT RE-EVAL EST PLAN CARE: CPT

## 2023-05-09 ENCOUNTER — APPOINTMENT (OUTPATIENT)
Dept: RADIOLOGY | Facility: CLINIC | Age: 53
End: 2023-05-09

## 2023-05-09 ENCOUNTER — OFFICE VISIT (OUTPATIENT)
Dept: URGENT CARE | Facility: CLINIC | Age: 53
End: 2023-05-09

## 2023-05-09 VITALS
RESPIRATION RATE: 18 BRPM | OXYGEN SATURATION: 98 % | SYSTOLIC BLOOD PRESSURE: 136 MMHG | TEMPERATURE: 98.4 F | HEART RATE: 83 BPM | DIASTOLIC BLOOD PRESSURE: 64 MMHG

## 2023-05-09 DIAGNOSIS — R05.1 ACUTE COUGH: ICD-10-CM

## 2023-05-09 DIAGNOSIS — J02.9 SORE THROAT: ICD-10-CM

## 2023-05-09 DIAGNOSIS — J06.9 VIRAL UPPER RESPIRATORY TRACT INFECTION WITH COUGH: Primary | ICD-10-CM

## 2023-05-09 LAB — S PYO AG THROAT QL: NEGATIVE

## 2023-05-09 RX ORDER — PREDNISONE 10 MG/1
TABLET ORAL
Qty: 24 TABLET | Refills: 0 | Status: SHIPPED | OUTPATIENT
Start: 2023-05-09

## 2023-05-09 RX ORDER — ATOMOXETINE 80 MG/1
80 CAPSULE ORAL DAILY
COMMUNITY
Start: 2023-04-23

## 2023-05-09 RX ORDER — TRAZODONE HYDROCHLORIDE 100 MG/1
TABLET ORAL
COMMUNITY
Start: 2023-03-08

## 2023-05-09 NOTE — PATIENT INSTRUCTIONS
Your strep A is negative  You have a throat culture pending  You are to download [employer] for the results in 3-4 days  You will be notified if the results are + and an antibiotic will be called in for you  You are to do warm salt water gargles 4 x daily  Drink warm tea with honey and lemon  Take tylenol or motrin as able for pain or fever  Chloraseptic throat spray, cough drops  Do not share utensils  Change your tooth brush in 3 days  Follow up with your PCP in 2-3 days  Go to the ED if symptoms worsen      Your xray was preliminarily read by your provider  A radiologist will read the xray and you will be notified if it is abnormal       You have been prescribed prednisone for cough and inflammation  You are to take delsym for cough if needed  You appear to have post nasal drip - you could try an antihistamine/claritin product for post nasal drip and mucous     Drink plenty of water

## 2023-05-09 NOTE — PROGRESS NOTES
"  [facility]        NAME: Mark Carranza is a 46 y o  female  : 1970    MRN: [id number]  DATE: May 9, 2023  TIME: 2:10 PM    Assessment and Plan   Viral upper respiratory tract infection with cough [J06 9]  1  Viral upper respiratory tract infection with cough  predniSONE 10 mg tablet      2  Sore throat  POCT rapid strepA    Throat culture    predniSONE 10 mg tablet      3  Acute cough  XR chest pa & lateral    predniSONE 10 mg tablet            Patient Instructions       Follow up with PCP in 3-5 days  Proceed to  ER if symptoms worsen  Your strep A is negative  You have a throat culture pending  You are to download [employer] for the results in 3-4 days  You will be notified if the results are + and an antibiotic will be called in for you  You are to do warm salt water gargles 4 x daily  Drink warm tea with honey and lemon  Take tylenol or motrin as able for pain or fever  Chloraseptic throat spray, cough drops  Do not share utensils  Change your tooth brush in 3 days  Follow up with your PCP in 2-3 days  Go to the ED if symptoms worsen      Your xray was preliminarily read by your provider  A radiologist will read the xray and you will be notified if it is abnormal       You have been prescribed prednisone for cough and inflammation  You are to take delsym for cough if needed  You appear to have post nasal drip - you could try an antihistamine/claritin product for post nasal drip and mucous  Drink plenty of water          Chief Complaint     Chief Complaint   Patient presents with   • Sore Throat     C/o sore throat and productive cough onset \"10 days\"  Denies OTC medications  Denies PCP follow up  History of Present Illness       This is a 46year old female who states has had cold like symptoms x 2 weeks  Started out as a throat tickle then sorethroat, non productive cough but finds clearing her throat makes her feel better  She is taking Vit C and warm drinks   " Denies taking any cough medication  She states family member had a viral sorethroat last week and she was exposed  She denies fevers, chills, n/v/d  She states she is going on vacation and wanted to check for strep  Pt has not seen her PCP for this  Denies hx of lung disease  Review of Systems   Review of Systems   Constitutional: Negative  HENT: Positive for congestion, postnasal drip and sore throat  Eyes: Negative  Respiratory: Positive for cough  Cardiovascular: Negative  Gastrointestinal: Negative  Endocrine: Negative  Genitourinary: Negative  Musculoskeletal: Negative  Skin: Negative  Allergic/Immunologic: Negative  Neurological: Negative  Hematological: Negative  Psychiatric/Behavioral: Negative  Current Medications       Current Outpatient Medications:   •  predniSONE 10 mg tablet, Take 5 tabs po x 2 days; 4 tabs po x 2 days; 3 tabs po x 1 day; 2 tabs po x 1 day  1 tab po x 1 day , Disp: 24 tablet, Rfl: 0  •  atomoxetine (STRATTERA) 80 MG capsule, Take 80 mg by mouth daily, Disp: , Rfl:   •  ciclopirox (PENLAC) 8 % solution, APPLY TO THE AFFECTED AREAS TOPICALLY ONCE DAILY PREFERABLY AT BEDTIME OR 8 HOURS BEFORE WASHING, Disp: , Rfl:   •  traZODone (DESYREL) 100 mg tablet, TAKE 2 TO 3 TABLETS BY MOUTH AT BEDTIME AS NEEDED FOR SLEEP, Disp: , Rfl:   •  UNKNOWN TO PATIENT, , Disp: , Rfl:     Current Allergies     Allergies as of 05/09/2023 - Reviewed 05/09/2023   Allergen Reaction Noted   • Morphine and related  11/01/2018            The following portions of the patient's history were reviewed and updated as appropriate: allergies, current medications, past family history, past medical history, past social history, past surgical history and problem list      Past Medical History:   Diagnosis Date   • Anxiety    • Depression        Past Surgical History:   Procedure Laterality Date   • APPENDECTOMY     • KNEE SURGERY      ACL and Meniscal Repair  History reviewed  No pertinent family history  Medications have been verified  Objective   /64 (BP Location: Left arm, Patient Position: Sitting)   Pulse 83   Temp 98 4 °F (36 9 °C) (Temporal)   Resp 18   SpO2 98%   No LMP recorded  Physical Exam     Physical Exam  Vitals and nursing note reviewed  Constitutional:       General: She is not in acute distress  Appearance: She is well-developed and normal weight  She is not ill-appearing, toxic-appearing or diaphoretic  HENT:      Head: Normocephalic and atraumatic  Right Ear: Tympanic membrane and ear canal normal       Left Ear: Tympanic membrane and ear canal normal       Nose: Congestion present  No rhinorrhea  Mouth/Throat:      Mouth: Mucous membranes are moist  No oral lesions  Pharynx: Oropharynx is clear  Uvula midline  No pharyngeal swelling, oropharyngeal exudate, posterior oropharyngeal erythema or uvula swelling  Tonsils: No tonsillar exudate or tonsillar abscesses  0 on the right  0 on the left  Eyes:      Extraocular Movements:      Right eye: Normal extraocular motion  Left eye: Normal extraocular motion  Cardiovascular:      Rate and Rhythm: Normal rate and regular rhythm  Heart sounds: Normal heart sounds  No murmur heard  Pulmonary:      Effort: Pulmonary effort is normal  No respiratory distress  Breath sounds: Normal breath sounds  No stridor  No wheezing, rhonchi or rales  Chest:      Chest wall: No tenderness  Musculoskeletal:      Cervical back: Normal range of motion and neck supple  Lymphadenopathy:      Cervical: No cervical adenopathy  Skin:     General: Skin is warm and dry  Capillary Refill: Capillary refill takes less than 2 seconds  Neurological:      General: No focal deficit present  Mental Status: She is alert and oriented to person, place, and time     Psychiatric:         Mood and Affect: Mood normal          Behavior: Behavior normal          Preliminary reading CXR  No acute process seen   Waiting on final read

## 2023-05-13 LAB — BACTERIA THROAT CULT: ABNORMAL

## 2023-05-15 RX ORDER — PENICILLIN V POTASSIUM 500 MG/1
500 TABLET ORAL 2 TIMES DAILY
Qty: 20 TABLET | Refills: 0 | Status: SHIPPED | OUTPATIENT
Start: 2023-05-15 | End: 2023-05-25

## 2024-09-06 ENCOUNTER — OFFICE VISIT (OUTPATIENT)
Dept: URGENT CARE | Facility: CLINIC | Age: 54
End: 2024-09-06
Payer: COMMERCIAL

## 2024-09-06 VITALS
DIASTOLIC BLOOD PRESSURE: 73 MMHG | HEIGHT: 69 IN | WEIGHT: 173 LBS | OXYGEN SATURATION: 98 % | TEMPERATURE: 98.2 F | HEART RATE: 72 BPM | SYSTOLIC BLOOD PRESSURE: 129 MMHG | RESPIRATION RATE: 20 BRPM | BODY MASS INDEX: 25.62 KG/M2

## 2024-09-06 DIAGNOSIS — L08.9: Primary | ICD-10-CM

## 2024-09-06 DIAGNOSIS — Z23 ENCOUNTER FOR IMMUNIZATION: ICD-10-CM

## 2024-09-06 DIAGNOSIS — S90.819A: Primary | ICD-10-CM

## 2024-09-06 PROCEDURE — 99214 OFFICE O/P EST MOD 30 MIN: CPT | Performed by: NURSE PRACTITIONER

## 2024-09-06 PROCEDURE — 90471 IMMUNIZATION ADMIN: CPT | Performed by: NURSE PRACTITIONER

## 2024-09-06 PROCEDURE — 90715 TDAP VACCINE 7 YRS/> IM: CPT

## 2024-09-06 RX ORDER — CEPHALEXIN 500 MG/1
500 CAPSULE ORAL EVERY 12 HOURS SCHEDULED
Qty: 14 CAPSULE | Refills: 0 | Status: SHIPPED | OUTPATIENT
Start: 2024-09-06 | End: 2024-09-13

## 2024-09-06 NOTE — PROGRESS NOTES
[facility]        NAME: Khalida Doe is a 54 y.o. female  : 1970    MRN: [id number]  DATE: 2024  TIME: 12:16 PM    Assessment and Plan   Abrasion of foot with infection, unspecified laterality, initial encounter [S90.819A, L08.9]  1. Abrasion of foot with infection, unspecified laterality, initial encounter  cephalexin (KEFLEX) 500 mg capsule    bilateral feet dorsal surface      2. Encounter for immunization  Tdap Vaccine greater than or equal to 6yo    cephalexin (KEFLEX) 500 mg capsule            Patient Instructions       Follow up with PCP in 3-5 days.  Proceed to  ER if symptoms worsen.    If tests have been performed at [facility], our office will contact you with results if changes need to be made to the care plan discussed with you at the visit.  You can review your full results on [facility] MyChart.      You are NOT to put any cream on the wounds.  The wounds need to dry out.  You are to let open to air at home at night.  You may wrap with plain 2x2 and emmanuel when wearing shoes  Do not apply bandaids - they make skin sweat  Take the cephalexin as prescribed   Take tylenol or motrin for pain.  Follow up with your PCP in 3-5 days  Go to the ED if symptoms worsen         Chief Complaint     Chief Complaint   Patient presents with    Wound Check     Bilateral top of feet wounds for 10 days from nylon dog leash scraping across it. Has been putting neosporin on wound          History of Present Illness       This is a 54 year old female who states that 10 days ago her dog rope leash caught the top of both feet and she sustained a robe burn abrasion to both feet. She has been applying neosporin twice a day and covering with bandaids. She states she tries to let them open to air at night.  She denies fevers, chills, n/v/d.  She has not seen her PCP.  She states unknown last Td. PMH is listed.     Wound Check        Review of Systems   Review of Systems   Constitutional: Negative.  "   HENT: Negative.     Eyes: Negative.    Respiratory: Negative.     Cardiovascular: Negative.    Gastrointestinal: Negative.    Endocrine: Negative.    Genitourinary: Negative.    Musculoskeletal: Negative.    Skin:  Positive for wound.   Allergic/Immunologic: Negative.    Neurological: Negative.    Hematological: Negative.    Psychiatric/Behavioral: Negative.           Current Medications       Current Outpatient Medications:     atomoxetine (STRATTERA) 80 MG capsule, Take 80 mg by mouth daily, Disp: , Rfl:     cephalexin (KEFLEX) 500 mg capsule, Take 1 capsule (500 mg total) by mouth every 12 (twelve) hours for 7 days, Disp: 14 capsule, Rfl: 0    traZODone (DESYREL) 100 mg tablet, TAKE 2 TO 3 TABLETS BY MOUTH AT BEDTIME AS NEEDED FOR SLEEP, Disp: , Rfl:     UNKNOWN TO PATIENT, , Disp: , Rfl:     ciclopirox (PENLAC) 8 % solution, APPLY TO THE AFFECTED AREAS TOPICALLY ONCE DAILY PREFERABLY AT BEDTIME OR 8 HOURS BEFORE WASHING (Patient not taking: Reported on 9/6/2024), Disp: , Rfl:     predniSONE 10 mg tablet, Take 5 tabs po x 2 days; 4 tabs po x 2 days; 3 tabs po x 1 day; 2 tabs po x 1 day. 1 tab po x 1 day. (Patient not taking: Reported on 9/6/2024), Disp: 24 tablet, Rfl: 0    Current Allergies     Allergies as of 09/06/2024 - Reviewed 09/06/2024   Allergen Reaction Noted    Morphine and codeine  11/01/2018            The following portions of the patient's history were reviewed and updated as appropriate: allergies, current medications, past family history, past medical history, past social history, past surgical history and problem list.     Past Medical History:   Diagnosis Date    Anxiety     Depression        Past Surgical History:   Procedure Laterality Date    APPENDECTOMY      KNEE SURGERY      ACL and Meniscal Repair.        History reviewed. No pertinent family history.      Medications have been verified.        Objective   /73   Pulse 72   Temp 98.2 °F (36.8 °C)   Resp 20   Ht 5' 9\" (1.753 " m)   Wt 78.5 kg (173 lb)   SpO2 98%   BMI 25.55 kg/m²   No LMP recorded.       Physical Exam     Physical Exam  Vitals and nursing note reviewed.   Constitutional:       General: She is not in acute distress.     Appearance: Normal appearance. She is normal weight. She is not ill-appearing, toxic-appearing or diaphoretic.   HENT:      Head: Normocephalic and atraumatic.   Eyes:      Extraocular Movements: Extraocular movements intact.   Cardiovascular:      Rate and Rhythm: Normal rate.      Pulses: Normal pulses.   Pulmonary:      Effort: Pulmonary effort is normal.   Musculoskeletal:         General: Normal range of motion.      Cervical back: Normal range of motion.        Feet:    Skin:     General: Skin is warm.      Capillary Refill: Capillary refill takes less than 2 seconds.   Neurological:      General: No focal deficit present.      Mental Status: She is alert and oriented to person, place, and time.   Psychiatric:         Mood and Affect: Mood normal.         Behavior: Behavior normal.         Thought Content: Thought content normal.         Judgment: Judgment normal.         Wound cleansed with wound cleanser   Dry sterile 2x2 and emmanuel applied to both feet  Education on wound care provided by RN

## 2024-09-06 NOTE — PATIENT INSTRUCTIONS
You are NOT to put any cream on the wounds.  The wounds need to dry out.  You are to let open to air at home at night.  You may wrap with plain 2x2 and emmanuel when wearing shoes  Do not apply bandaids - they make skin sweat  Take the cephalexin as prescribed   Take tylenol or motrin for pain.  Follow up with your PCP in 3-5 days  Go to the ED if symptoms worsen

## 2024-12-22 ENCOUNTER — OFFICE VISIT (OUTPATIENT)
Dept: URGENT CARE | Facility: CLINIC | Age: 54
End: 2024-12-22
Payer: COMMERCIAL

## 2024-12-22 VITALS
TEMPERATURE: 98.7 F | RESPIRATION RATE: 18 BRPM | BODY MASS INDEX: 25.92 KG/M2 | WEIGHT: 175 LBS | HEIGHT: 69 IN | HEART RATE: 88 BPM | SYSTOLIC BLOOD PRESSURE: 138 MMHG | DIASTOLIC BLOOD PRESSURE: 66 MMHG | OXYGEN SATURATION: 99 %

## 2024-12-22 DIAGNOSIS — H01.004 BLEPHARITIS OF LEFT UPPER EYELID, UNSPECIFIED TYPE: Primary | ICD-10-CM

## 2024-12-22 PROCEDURE — 99213 OFFICE O/P EST LOW 20 MIN: CPT

## 2024-12-22 RX ORDER — CEPHALEXIN 500 MG/1
500 CAPSULE ORAL EVERY 8 HOURS SCHEDULED
Qty: 21 CAPSULE | Refills: 0 | Status: SHIPPED | OUTPATIENT
Start: 2024-12-22 | End: 2024-12-29

## 2024-12-22 NOTE — PROGRESS NOTES
[facility]        NAME: Khalida Doe is a 54 y.o. female  : 1970    MRN: [id number]  DATE: 2024  TIME: 3:10 PM    Assessment and Plan   Blepharitis of left upper eyelid, unspecified type [H01.004]  1. Blepharitis of left upper eyelid, unspecified type  cephalexin (KEFLEX) 500 mg capsule            Patient Instructions     Clean the eyes with baby shampoo every 4-6 hours.  If the redness, inflammation, and swelling worsens over the next day or 2, Start the antibiotic.  Follow up with PCP in 3-5 days.  Proceed to  ER if symptoms worsen.    If tests have been performed at [facility], our office will contact you with results if changes need to be made to the care plan discussed with you at the visit.  [facility]    Chief Complaint     Chief Complaint   Patient presents with    Eye Pain     Left eye red and swollen which started. Left ear feels like it has fluid in it.          History of Present Illness       Eye Pain (Left eye red and swollen which started today. Left ear feels like it has fluid in it. )  Ear has improved since she has been here.  Swelling of the left upper lid.    Eye Pain   The left eye is affected. This is a new problem. The current episode started today. There was no injury mechanism. The pain is mild. Associated symptoms include eye redness.       Review of Systems   Review of Systems   Constitutional: Negative.    HENT:  Positive for ear pain.    Eyes:  Positive for pain and redness.         Current Medications       Current Outpatient Medications:     atomoxetine (STRATTERA) 80 MG capsule, Take 80 mg by mouth daily, Disp: , Rfl:     cephalexin (KEFLEX) 500 mg capsule, Take 1 capsule (500 mg total) by mouth every 8 (eight) hours for 7 days, Disp: 21 capsule, Rfl: 0    traZODone (DESYREL) 100 mg tablet, TAKE 2 TO 3 TABLETS BY MOUTH AT BEDTIME AS NEEDED FOR SLEEP, Disp: , Rfl:     UNKNOWN TO PATIENT, , Disp: , Rfl:      "ciclopirox (PENLAC) 8 % solution, APPLY TO THE AFFECTED AREAS TOPICALLY ONCE DAILY PREFERABLY AT BEDTIME OR 8 HOURS BEFORE WASHING (Patient not taking: Reported on 12/22/2024), Disp: , Rfl:     predniSONE 10 mg tablet, Take 5 tabs po x 2 days; 4 tabs po x 2 days; 3 tabs po x 1 day; 2 tabs po x 1 day. 1 tab po x 1 day. (Patient not taking: Reported on 12/22/2024), Disp: 24 tablet, Rfl: 0    Current Allergies     Allergies as of 12/22/2024 - Reviewed 12/22/2024   Allergen Reaction Noted    Morphine and codeine Hives 11/01/2018            The following portions of the patient's history were reviewed and updated as appropriate: allergies, current medications, past family history, past medical history, past social history, past surgical history and problem list.     Past Medical History:   Diagnosis Date    Anxiety     Depression        Past Surgical History:   Procedure Laterality Date    APPENDECTOMY      KNEE SURGERY      ACL and Meniscal Repair.        No family history on file.      Medications have been verified.        Objective   /66   Pulse 88   Temp 98.7 °F (37.1 °C)   Resp 18   Ht 5' 9\" (1.753 m)   Wt 79.4 kg (175 lb)   SpO2 99%   BMI 25.84 kg/m²   No LMP recorded.       Physical Exam     Physical Exam  Vitals and nursing note reviewed.   Constitutional:       General: She is not in acute distress.     Appearance: Normal appearance. She is well-developed.   HENT:      Right Ear: Tympanic membrane and external ear normal.      Left Ear: Tympanic membrane and external ear normal.      Nose: Nose normal.      Mouth/Throat:      Pharynx: No oropharyngeal exudate.   Eyes:      Conjunctiva/sclera: Conjunctivae normal.      Comments: Left upper lid with redness, inflammation, mild swelling.   Cardiovascular:      Rate and Rhythm: Normal rate and regular rhythm.      Heart sounds: Normal heart sounds. No murmur heard.  Pulmonary:      Effort: Pulmonary effort is normal. No respiratory distress.      " Breath sounds: Normal breath sounds. No wheezing or rales.   Chest:      Chest wall: No tenderness.   Musculoskeletal:         General: Normal range of motion.      Cervical back: Normal range of motion and neck supple.   Lymphadenopathy:      Cervical: No cervical adenopathy.   Skin:     General: Skin is warm.      Findings: No erythema or rash.   Neurological:      Mental Status: She is alert and oriented to person, place, and time.

## 2024-12-22 NOTE — PATIENT INSTRUCTIONS
Clean the eyes with baby shampoo every 4-6 hours.  If the redness, inflammation, and swelling worsens over the next day or 2, Start the antibiotic.  Follow up with PCP in 3-5 days.  Proceed to  ER if symptoms worsen.    Patient Education     Blepharitis   The Basics   Written by the doctors and editors at [facility]   What is blepharitis? -- Blepharitis is inflammation of the eyelids. It can make the eyelids swell, and make the skin of the eyelids look red or darkened. The symptoms might get better and then come back. But blepharitis rarely causes problems with your vision.  Blepharitis is more common in people who have certain skin conditions, including:   Rosacea - This causes raised bumps on the cheeks, nose, chin, forehead, or eyelids. In people with lighter skin, the bumps and skin of the face usually look red.   Seborrheic dermatitis - This causes scaly or flaky patches and sometimes itching. It usually affects parts of the skin with many oil glands. Dandruff is a mild form of seborrhea. In people with lighter skin, the skin might look red. In people with darker skin, the skin might look dark brown or purple.  What are the symptoms of blepharitis? -- The symptoms include:   Red-looking or darkened skin on the eyelids   Swollen and itchy eyelids   Gritty or burning feeling in the eyes   Red eyes   Crusty, matted eyelashes in the morning   Flaking or scaling of the eyelid skin (picture 1)  Is there a test for blepharitis? -- No. There is no test. But your doctor or nurse should be able to tell if you have it by learning about your symptoms and doing an exam.  Is there anything I can do on my own to feel better? -- Yes. You can:   Put warm, wet pressure on your eyes - Wet a clean washcloth with warm (not hot) water and put it over your eyes. When the washcloth cools, reheat it with warm water and put it back over your eyes. Repeat these steps for 5 minutes, 2 to 4 times a day.   Gently rub your eyelids - Do this  right after putting warm, wet pressure on your eyes. Use the washcloth or a clean fingertip to gently rub your eyelid in small circles.   Wash your eyelids - Use plain warm water or warm water with a drop of baby shampoo on a clean washcloth, gauze pad, or cotton swab. Gently clean any crusty material off of your eyelashes and eyelids. Do not rub hard or you can cause more irritation. You can also use over-the-counter eyelid scrubs and pads.  How is blepharitis treated? -- If the treatments you do on your own do not help, your doctor might prescribe:   An antibiotic cream or ointment to put on your eyelids   Antibiotic pills  All topics are updated as new evidence becomes available and our peer review process is complete.  This topic retrieved from [employer] on: Feb 26, 2024.  Topic 58802 Version 16.0  Release: 32.2.4 - C32.56  © 2024 Up[employer], Inc[employer] and/or its affiliates. All rights reserved.  picture 1: Blepharitis     Blepharitis is an inflammation of the eyelids that causes redness and swelling. The skin of the eyelids might also scale or flake, as in this picture.  Graphic 89137 Version 1.0  Consumer Information Use and Disclaimer   Disclaimer: This generalized information is a limited summary of diagnosis, treatment, and/or medication information. It is not meant to be comprehensive and should be used as a tool to help the user understand and/or assess potential diagnostic and treatment options. It does NOT include all information about conditions, treatments, medications, side effects, or risks that may apply to a specific patient. It is not intended to be medical advice or a substitute for the medical advice, diagnosis, or treatment of a health care provider based on the health care provider's examination and assessment of a patient's specific and unique circumstances. Patients must speak with a health care provider for complete information about their health, medical questions, and treatment options,  including any risks or benefits regarding use of medications. This information does not endorse any treatments or medications as safe, effective, or approved for treating a specific patient. Up[employer], Inc[employer] and its affiliates disclaim any warranty or liability relating to this information or the use thereof.The use of this information is governed by the Terms of Use, available at https://www.[employer].com/en/know/clinical-effectiveness-terms. 2024© Up[employer]Date, Inc. and its affiliates and/or licensors. All rights reserved.  Copyright   © 2024 [employer], [employer]. and/or its affiliates. All rights reserved.